# Patient Record
Sex: MALE | Race: WHITE | Employment: FULL TIME | URBAN - NONMETROPOLITAN AREA
[De-identification: names, ages, dates, MRNs, and addresses within clinical notes are randomized per-mention and may not be internally consistent; named-entity substitution may affect disease eponyms.]

---

## 2019-12-06 ENCOUNTER — OFFICE VISIT (OUTPATIENT)
Dept: URGENT CARE | Age: 49
End: 2019-12-06
Payer: COMMERCIAL

## 2019-12-06 ENCOUNTER — HOSPITAL ENCOUNTER (OUTPATIENT)
Dept: GENERAL RADIOLOGY | Age: 49
Discharge: HOME | End: 2019-12-06
Payer: COMMERCIAL

## 2019-12-06 VITALS
SYSTOLIC BLOOD PRESSURE: 160 MMHG | OXYGEN SATURATION: 98 % | DIASTOLIC BLOOD PRESSURE: 86 MMHG | WEIGHT: 265 LBS | BODY MASS INDEX: 37.94 KG/M2 | RESPIRATION RATE: 18 BRPM | HEART RATE: 102 BPM | HEIGHT: 70 IN | TEMPERATURE: 98.9 F
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
## 2019-12-06 DIAGNOSIS — M54.2 NECK PAIN, ACUTE: ICD-10-CM

## 2019-12-06 DIAGNOSIS — M54.50 ACUTE LOW BACK PAIN WITHOUT SCIATICA, UNSPECIFIED BACK PAIN LATERALITY: ICD-10-CM

## 2019-12-06 PROCEDURE — 72040 X-RAY EXAM NECK SPINE 2-3 VW: CPT

## 2019-12-06 PROCEDURE — 72100 X-RAY EXAM L-S SPINE 2/3 VWS: CPT

## 2019-12-06 PROCEDURE — 99202 OFFICE O/P NEW SF 15 MIN: CPT | Performed by: NURSE PRACTITIONER

## 2019-12-06 RX ORDER — TIZANIDINE 4 MG/1
4 TABLET ORAL EVERY 8 HOURS PRN
Qty: 30 TABLET | Refills: 0 | Status: SHIPPED | OUTPATIENT
Start: 2019-12-06 | End: 2019-12-16

## 2019-12-06 SDOH — HEALTH STABILITY: MENTAL HEALTH: HOW OFTEN DO YOU HAVE A DRINK CONTAINING ALCOHOL?: NEVER

## 2019-12-06 ASSESSMENT — ENCOUNTER SYMPTOMS
PHOTOPHOBIA: 0
VISUAL CHANGE: 0
SINUS PRESSURE: 0
EYES NEGATIVE: 1
COUGH: 0
SHORTNESS OF BREATH: 0
SORE THROAT: 0
EYE WATERING: 0
EYE REDNESS: 0
EYE PAIN: 0
VOMITING: 0
BLURRED VISION: 0
ABDOMINAL PAIN: 0
SCALP TENDERNESS: 0
ALLERGIC/IMMUNOLOGIC NEGATIVE: 1
BACK PAIN: 1
DIARRHEA: 0
NAUSEA: 0

## 2019-12-06 ASSESSMENT — VISUAL ACUITY: OU: 1

## 2023-08-08 ENCOUNTER — OFFICE VISIT (OUTPATIENT)
Dept: FAMILY MEDICINE CLINIC | Facility: CLINIC | Age: 53
End: 2023-08-08
Payer: COMMERCIAL

## 2023-08-08 ENCOUNTER — TELEPHONE (OUTPATIENT)
Dept: FAMILY MEDICINE CLINIC | Facility: CLINIC | Age: 53
End: 2023-08-08

## 2023-08-08 VITALS
SYSTOLIC BLOOD PRESSURE: 148 MMHG | WEIGHT: 268 LBS | HEIGHT: 70 IN | BODY MASS INDEX: 38.37 KG/M2 | DIASTOLIC BLOOD PRESSURE: 92 MMHG | TEMPERATURE: 99.3 F | HEART RATE: 75 BPM | RESPIRATION RATE: 20 BRPM

## 2023-08-08 DIAGNOSIS — H60.391 OTHER INFECTIVE ACUTE OTITIS EXTERNA OF RIGHT EAR: Primary | ICD-10-CM

## 2023-08-08 DIAGNOSIS — R59.0 POSTERIOR AURICULAR LYMPHADENOPATHY: ICD-10-CM

## 2023-08-08 DIAGNOSIS — N52.9 ERECTILE DYSFUNCTION, UNSPECIFIED ERECTILE DYSFUNCTION TYPE: ICD-10-CM

## 2023-08-08 RX ORDER — SILDENAFIL 100 MG/1
100 TABLET, FILM COATED ORAL DAILY PRN
Qty: 30 TABLET | Refills: 0 | Status: SHIPPED | OUTPATIENT
Start: 2023-08-08

## 2023-08-08 RX ORDER — CIPROFLOXACIN 0.5 MG/.25ML
SOLUTION/ DROPS AURICULAR (OTIC)
Qty: 1 EACH | Refills: 0 | Status: SHIPPED | OUTPATIENT
Start: 2023-08-08 | End: 2023-08-08

## 2023-08-08 RX ORDER — LEVOFLOXACIN 500 MG/1
500 TABLET, FILM COATED ORAL DAILY
Qty: 10 TABLET | Refills: 0 | Status: SHIPPED | OUTPATIENT
Start: 2023-08-08 | End: 2023-08-18

## 2023-08-08 RX ORDER — OFLOXACIN 3 MG/ML
SOLUTION AURICULAR (OTIC)
Qty: 10 ML | Refills: 0 | Status: SHIPPED | OUTPATIENT
Start: 2023-08-08

## 2023-08-08 RX ORDER — CEFTRIAXONE 1 G/1
1 INJECTION, POWDER, FOR SOLUTION INTRAMUSCULAR; INTRAVENOUS EVERY 24 HOURS
Status: COMPLETED | OUTPATIENT
Start: 2023-08-08 | End: 2023-08-08

## 2023-08-08 RX ADMIN — CEFTRIAXONE 1 G: 1 INJECTION, POWDER, FOR SOLUTION INTRAMUSCULAR; INTRAVENOUS at 13:08

## 2023-08-08 NOTE — PROGRESS NOTES
"Chief Complaint  right earache, erecetile dysfunction, and possible ear infection    Subjective    History of Present Illness      Patient presents to [facility] for   History of Present Illness  Pt is here today c/o of an earache in his right ear.  Temp is currently 99.3 and pt said he did have ibuprofen around 6 AM today.  Pt states he went scuba diving over the weekend and thinks he has an ear infection now.  Pt also wanted to discuss erectile dysfunction and what treatment options are available.     Review of Systems    I have reviewed and agree with the HPI and ROS information as above.  Yusra Nance, APRN     Objective   Vital Signs:   /92   Pulse 75   Temp 99.3 øF (37.4 øC)   Resp 20   Ht 177.8 cm (70\")   Wt 122 kg (268 lb)   BMI 38.45 kg/mý         Physical Exam  Constitutional:       Appearance: He is well-developed. He is obese.   HENT:      Head: Normocephalic and atraumatic.      Right Ear: External ear normal. Swelling and tenderness present.      Left Ear: Tympanic membrane, ear canal and external ear normal.      Ears:      Comments: Moderate post auricular lymphadenopathy of the right side      Nose: Nose normal. No septal deviation, nasal tenderness or congestion.      Mouth/Throat:      Lips: Pink. No lesions.      Mouth: Mucous membranes are moist. No oral lesions.      Dentition: Normal dentition.      Pharynx: Oropharynx is clear. No pharyngeal swelling, oropharyngeal exudate or posterior oropharyngeal erythema.   Eyes:      General: Lids are normal. Vision grossly intact. No scleral icterus.        Right eye: No discharge.         Left eye: No discharge.      Extraocular Movements: Extraocular movements intact.      Conjunctiva/sclera: Conjunctivae normal.      Right eye: Right conjunctiva is not injected.      Left eye: Left conjunctiva is not injected.      Pupils: Pupils are equal, round, and reactive to light.   Neck:      Thyroid: No thyroid " mass.      Trachea: Trachea normal.   Cardiovascular:      Rate and Rhythm: Normal rate and regular rhythm.      Heart sounds: Normal heart sounds. No murmur heard.    No gallop.   Pulmonary:      Effort: Pulmonary effort is normal.      Breath sounds: Normal breath sounds and air entry. No wheezing, rhonchi or rales.   Abdominal:      General: There is no distension.      Palpations: Abdomen is soft. There is no mass.      Tenderness: There is no abdominal tenderness. There is no right CVA tenderness, left CVA tenderness, guarding or rebound.   Musculoskeletal:         General: No tenderness or deformity. Normal range of motion.      Cervical back: Full passive range of motion without pain, normal range of motion and neck supple.      Thoracic back: Normal.      Right lower leg: No edema.      Left lower leg: No edema.   Skin:     General: Skin is warm and dry.      Coloration: Skin is not jaundiced.      Findings: No rash.   Neurological:      Mental Status: He is alert and oriented to person, place, and time.      Sensory: Sensation is intact.      Motor: Motor function is intact.      Coordination: Coordination is intact.      Gait: Gait is intact.      Deep Tendon Reflexes: Reflexes are normal and symmetric.   Psychiatric:         Mood and Affect: Mood and affect normal.         Judgment: Judgment normal.             Result Review  Data Reviewed:                   Assessment and Plan      Diagnoses and all orders for this visit:    1. Other infective acute otitis externa of right ear (Primary)  -     cefTRIAXone (ROCEPHIN) injection 1 g  -     levoFLOXacin (Levaquin) 500 MG tablet; Take 1 tablet by mouth Daily for 10 days.  Dispense: 10 tablet; Refill: 0  -     Discontinue: Ciprofloxacin HCl (CETRAXAL) 0.2 % otic solution; 0.25ml in right ear bid x 7 days  Dispense: 1 each; Refill: 0  -     ofloxacin (Floxin Otic) 0.3 % otic solution; 5 drops in draining ear BID x 7 days  Dispense: 10 mL; Refill: 0    2.  Posterior auricular lymphadenopathy    3. Erectile dysfunction, unspecified erectile dysfunction type  -     sildenafil (Viagra) 100 MG tablet; Take 1 tablet by mouth Daily As Needed for Erectile Dysfunction.  Dispense: 30 tablet; Refill: 0    Plan:   Treat acutely with Narciso IM, follow with levaquin and ear drops.   Recheck tomorrow with 2nd Rocephin IM.   Trial Viagra prn. Counseling done. Monitor home Bps.         Follow Up   Return in about 1 day (around 8/9/2023).  Patient was given instructions and counseling regarding his condition or for health maintenance advice. Please see specific information pulled into the AVS if appropriate.

## 2023-08-08 NOTE — PROGRESS NOTES
After obtaining consent, and per orders of MANUELA Vega, an injection of Rocephin 1 g was  given by Eliud Henry MA and administered to the right dorso-gluteal IM. Patient instructed to remain in clinic for 20 minutes afterwards, and to report any adverse reaction to me immediately. Pt tolerated well with no adverse reactions.

## 2023-08-08 NOTE — TELEPHONE ENCOUNTER
Caller: [facility] Saavedra [facility]    Relationship: Other    Best call back number:  [phone]    What is the best time to reach you:  ANYTIME    Who are you requesting to speak with:  CLINICAL    What was the call regarding:  [facility] NEEDS CLARIFICATION ON DIRECTIONS REGARDING CIPROFLOXACIN.  CALLER SAID THEY'RE NOT SURE HOW TO MEASURE OUT THE 0.25 ML (HOW MANY DROPS EQUAL 0.25 ML).  CALLER SAID ALL  THEY HAVE RIGHT NOW IS 0.3% EYE DROPS, WHICH CAN'T BE USED IN EARS.      Do you require a call back?  YES

## 2023-08-09 ENCOUNTER — OFFICE VISIT (OUTPATIENT)
Dept: FAMILY MEDICINE CLINIC | Facility: CLINIC | Age: 53
End: 2023-08-09
Payer: COMMERCIAL

## 2023-08-09 VITALS
HEIGHT: 70 IN | DIASTOLIC BLOOD PRESSURE: 88 MMHG | RESPIRATION RATE: 20 BRPM | SYSTOLIC BLOOD PRESSURE: 147 MMHG | WEIGHT: 268 LBS | HEART RATE: 72 BPM | TEMPERATURE: 98.6 F | BODY MASS INDEX: 38.37 KG/M2

## 2023-08-09 DIAGNOSIS — R59.0 PREAURICULAR LYMPHADENOPATHY: ICD-10-CM

## 2023-08-09 DIAGNOSIS — H60.391 OTHER INFECTIVE ACUTE OTITIS EXTERNA OF RIGHT EAR: Primary | ICD-10-CM

## 2023-08-09 DIAGNOSIS — R59.0 POSTERIOR AURICULAR LYMPHADENOPATHY: ICD-10-CM

## 2023-08-09 RX ORDER — CEFTRIAXONE 1 G/1
1 INJECTION, POWDER, FOR SOLUTION INTRAMUSCULAR; INTRAVENOUS EVERY 24 HOURS
Status: COMPLETED | OUTPATIENT
Start: 2023-08-09 | End: 2023-08-09

## 2023-08-09 RX ORDER — DEXAMETHASONE SODIUM PHOSPHATE 10 MG/ML
8 INJECTION INTRAMUSCULAR; INTRAVENOUS ONCE
Status: COMPLETED | OUTPATIENT
Start: 2023-08-09 | End: 2023-08-09

## 2023-08-09 RX ADMIN — DEXAMETHASONE SODIUM PHOSPHATE 8 MG: 10 INJECTION INTRAMUSCULAR; INTRAVENOUS at 16:20

## 2023-08-09 RX ADMIN — CEFTRIAXONE 1 G: 1 INJECTION, POWDER, FOR SOLUTION INTRAMUSCULAR; INTRAVENOUS at 16:19

## 2023-08-09 NOTE — PROGRESS NOTES
After obtaining consent, and per orders of MANUELA Vega, an injection of Decadron 8 mg was  given by Eliud Henry MA and administered to the right dorsogluteal IM. Patient instructed to remain in clinic for 20 minutes afterwards, and to report any adverse reaction to me immediately. Pt tolerated well with no adverse reactions.

## 2023-08-09 NOTE — PROGRESS NOTES
After obtaining consent, and per orders of MANUELA Vega, an injection of Rocephin 1 g was given by Eliud Henry MA and administered to the left dorsogluteal IM. Patient instructed to remain in clinic for 20 minutes afterwards, and to report any adverse reaction to me immediately. Pt tolerated well with no adverse reactions.

## 2023-08-09 NOTE — PROGRESS NOTES
"Chief Complaint  ear infection    Subjective    History of Present Illness      Patient presents to [facility] for   History of Present Illness  Pt is here for a 1 day f/u and a rocephin shot for a right external ear canal infection. Pt aslo reports the wick fell out of his ear. He thinks he is some better.      Review of Systems    I have reviewed and agree with the HPI and ROS information as above.  Yusra Gisellecésar Nance, APRN     Objective   Vital Signs:   /88   Pulse 72   Temp 98.6 øF (37 øC)   Resp 20   Ht 177.8 cm (70\")   Wt 122 kg (268 lb)   BMI 38.45 kg/mý         Physical Exam  Constitutional:       Appearance: He is well-developed. He is obese.   HENT:      Head: Normocephalic and atraumatic.      Right Ear: External ear normal. Swelling present.      Left Ear: Tympanic membrane, ear canal and external ear normal.      Ears:      Comments: Pre and posterior auricular lymphadenopathy right sided      Nose: Nose normal. No septal deviation, nasal tenderness or congestion.      Mouth/Throat:      Lips: Pink. No lesions.      Mouth: Mucous membranes are moist. No oral lesions.      Dentition: Normal dentition.      Pharynx: Oropharynx is clear. No pharyngeal swelling, oropharyngeal exudate or posterior oropharyngeal erythema.   Eyes:      General: Lids are normal. Vision grossly intact. No scleral icterus.        Right eye: No discharge.         Left eye: No discharge.      Extraocular Movements: Extraocular movements intact.      Conjunctiva/sclera: Conjunctivae normal.      Right eye: Right conjunctiva is not injected.      Left eye: Left conjunctiva is not injected.      Pupils: Pupils are equal, round, and reactive to light.   Neck:      Thyroid: No thyroid mass.      Trachea: Trachea normal.   Cardiovascular:      Rate and Rhythm: Normal rate and regular rhythm.      Heart sounds: Normal heart sounds. No murmur heard.    No gallop.   Pulmonary:      Effort: Pulmonary " effort is normal.      Breath sounds: Normal breath sounds and air entry. No wheezing, rhonchi or rales.   Abdominal:      General: There is no distension.      Palpations: Abdomen is soft. There is no mass.      Tenderness: There is no abdominal tenderness. There is no right CVA tenderness, left CVA tenderness, guarding or rebound.   Musculoskeletal:         General: No tenderness or deformity. Normal range of motion.      Cervical back: Full passive range of motion without pain, normal range of motion and neck supple.      Thoracic back: Normal.      Right lower leg: No edema.      Left lower leg: No edema.   Skin:     General: Skin is warm and dry.      Coloration: Skin is not jaundiced.      Findings: No rash.   Neurological:      Mental Status: He is alert and oriented to person, place, and time.      Sensory: Sensation is intact.      Motor: Motor function is intact.      Coordination: Coordination is intact.      Gait: Gait is intact.      Deep Tendon Reflexes: Reflexes are normal and symmetric.   Psychiatric:         Mood and Affect: Mood and affect normal.         Judgment: Judgment normal.        JEANETTE-7: Over the last two weeks, how often have you been bothered by the following problems?  Feeling nervous, anxious or on edge: Not at all  Not being able to stop or control worrying: Not at all  Worrying too much about different things: Not at all  Trouble Relaxing: Not at all  Being so restless that it is hard to sit still: Not at all  Becoming easily annoyed or irritable: Not at all  Feeling afraid as if something awful might happen: Not at all  JEANETTE 7 Total Score: 0  If you checked any problems, how difficult have these problems made it for you to do your work, take care of things at home, or get along with other people: Not difficult at all    PHQ-2 Depression Screening  Little interest or pleasure in doing things? 0-->not at all   Feeling down, depressed, or hopeless? 0-->not at all   PHQ-2 Total Score 0      PHQ-9 Depression Screening  Little interest or pleasure in doing things? 0-->not at all   Feeling down, depressed, or hopeless? 0-->not at all   Trouble falling or staying asleep, or sleeping too much?     Feeling tired or having little energy?     Poor appetite or overeating?     Feeling bad about yourself - or that you are a failure or have let yourself or your family down?     Trouble concentrating on things, such as reading the newspaper or watching television?     Moving or speaking so slowly that other people could have noticed? Or the opposite - being so fidgety or restless that you have been moving around a lot more than usual?     Thoughts that you would be better off dead, or of hurting yourself in some way?     PHQ-9 Total Score 0   If you checked off any problems, how difficult have these problems made it for you to do your work, take care of things at home, or get along with other people?        Result Review  Data Reviewed:                   Assessment and Plan      Diagnoses and all orders for this visit:    1. Other infective acute otitis externa of right ear (Primary)  -     cefTRIAXone (ROCEPHIN) injection 1 g  -     dexAMETHasone (DECADRON) injection 8 mg    2. Posterior auricular lymphadenopathy  -     cefTRIAXone (ROCEPHIN) injection 1 g  -     dexAMETHasone (DECADRON) injection 8 mg    3. Preauricular lymphadenopathy  -     cefTRIAXone (ROCEPHIN) injection 1 g  -     dexAMETHasone (DECADRON) injection 8 mg    Recheck slightly better today. Dr Amin also examined.   Plan:   Second rocephin and steroid Im today.   Continue Levaquin PO and drops.   Recheck in 2 days if needed. Er if worsening.         Follow Up   Return in about 2 days (around 8/11/2023).  Patient was given instructions and counseling regarding his condition or for health maintenance advice. Please see specific information pulled into the AVS if appropriate.

## 2023-10-19 ENCOUNTER — APPOINTMENT (OUTPATIENT)
Dept: GENERAL RADIOLOGY | Facility: HOSPITAL | Age: 53
End: 2023-10-19
Payer: COMMERCIAL

## 2023-10-19 ENCOUNTER — HOSPITAL ENCOUNTER (OUTPATIENT)
Facility: HOSPITAL | Age: 53
Setting detail: OBSERVATION
Discharge: HOME | End: 2023-10-20
Attending: STUDENT IN AN ORGANIZED HEALTH CARE EDUCATION/TRAINING PROGRAM | Admitting: INTERNAL MEDICINE
Payer: COMMERCIAL

## 2023-10-19 DIAGNOSIS — R61 DIAPHORESIS: ICD-10-CM

## 2023-10-19 DIAGNOSIS — R79.89 ELEVATED TROPONIN: ICD-10-CM

## 2023-10-19 DIAGNOSIS — R06.02 SHORTNESS OF BREATH: ICD-10-CM

## 2023-10-19 DIAGNOSIS — R07.9 ACUTE CHEST PAIN: ICD-10-CM

## 2023-10-19 DIAGNOSIS — R00.2 PALPITATIONS: ICD-10-CM

## 2023-10-19 DIAGNOSIS — E78.1 HIGH BLOOD TRIGLYCERIDES: ICD-10-CM

## 2023-10-19 DIAGNOSIS — I47.10 SUPRAVENTRICULAR TACHYCARDIA: Primary | ICD-10-CM

## 2023-10-19 PROCEDURE — 36415 COLL VENOUS BLD VENIPUNCTURE: CPT

## 2023-10-19 PROCEDURE — 80053 COMPREHEN METABOLIC PANEL: CPT | Performed by: STUDENT IN AN ORGANIZED HEALTH CARE EDUCATION/TRAINING PROGRAM

## 2023-10-19 PROCEDURE — 25010000002 ADENOSINE PER 6 MG

## 2023-10-19 PROCEDURE — 83690 ASSAY OF LIPASE: CPT | Performed by: STUDENT IN AN ORGANIZED HEALTH CARE EDUCATION/TRAINING PROGRAM

## 2023-10-19 PROCEDURE — 80061 LIPID PANEL: CPT | Performed by: STUDENT IN AN ORGANIZED HEALTH CARE EDUCATION/TRAINING PROGRAM

## 2023-10-19 PROCEDURE — 85025 COMPLETE CBC W/AUTO DIFF WBC: CPT | Performed by: STUDENT IN AN ORGANIZED HEALTH CARE EDUCATION/TRAINING PROGRAM

## 2023-10-19 PROCEDURE — 99285 EMERGENCY DEPT VISIT HI MDM: CPT

## 2023-10-19 PROCEDURE — 93005 ELECTROCARDIOGRAM TRACING: CPT | Performed by: STUDENT IN AN ORGANIZED HEALTH CARE EDUCATION/TRAINING PROGRAM

## 2023-10-19 PROCEDURE — 83036 HEMOGLOBIN GLYCOSYLATED A1C: CPT | Performed by: STUDENT IN AN ORGANIZED HEALTH CARE EDUCATION/TRAINING PROGRAM

## 2023-10-19 PROCEDURE — 83735 ASSAY OF MAGNESIUM: CPT | Performed by: STUDENT IN AN ORGANIZED HEALTH CARE EDUCATION/TRAINING PROGRAM

## 2023-10-19 PROCEDURE — 71045 X-RAY EXAM CHEST 1 VIEW: CPT

## 2023-10-19 PROCEDURE — 83880 ASSAY OF NATRIURETIC PEPTIDE: CPT | Performed by: STUDENT IN AN ORGANIZED HEALTH CARE EDUCATION/TRAINING PROGRAM

## 2023-10-19 PROCEDURE — 25810000003 LACTATED RINGERS SOLUTION: Performed by: STUDENT IN AN ORGANIZED HEALTH CARE EDUCATION/TRAINING PROGRAM

## 2023-10-19 PROCEDURE — 93010 ELECTROCARDIOGRAM REPORT: CPT | Performed by: INTERNAL MEDICINE

## 2023-10-19 PROCEDURE — 87636 SARSCOV2 & INF A&B AMP PRB: CPT | Performed by: STUDENT IN AN ORGANIZED HEALTH CARE EDUCATION/TRAINING PROGRAM

## 2023-10-19 PROCEDURE — 96374 THER/PROPH/DIAG INJ IV PUSH: CPT

## 2023-10-19 PROCEDURE — 84484 ASSAY OF TROPONIN QUANT: CPT | Performed by: STUDENT IN AN ORGANIZED HEALTH CARE EDUCATION/TRAINING PROGRAM

## 2023-10-19 PROCEDURE — 93005 ELECTROCARDIOGRAM TRACING: CPT

## 2023-10-19 PROCEDURE — 85007 BL SMEAR W/DIFF WBC COUNT: CPT | Performed by: STUDENT IN AN ORGANIZED HEALTH CARE EDUCATION/TRAINING PROGRAM

## 2023-10-19 RX ORDER — ADENOSINE 3 MG/ML
INJECTION, SOLUTION INTRAVENOUS
Status: COMPLETED
Start: 2023-10-19 | End: 2023-10-19

## 2023-10-19 RX ORDER — ASPIRIN 81 MG/1
324 TABLET, CHEWABLE ORAL ONCE
Status: COMPLETED | OUTPATIENT
Start: 2023-10-19 | End: 2023-10-20

## 2023-10-19 RX ORDER — ADENOSINE 3 MG/ML
12 INJECTION, SOLUTION INTRAVENOUS ONCE AS NEEDED
Status: COMPLETED | OUTPATIENT
Start: 2023-10-19 | End: 2023-10-19

## 2023-10-19 RX ORDER — SODIUM CHLORIDE 0.9 % (FLUSH) 0.9 %
10 SYRINGE (ML) INJECTION AS NEEDED
Status: DISCONTINUED | OUTPATIENT
Start: 2023-10-19 | End: 2023-10-20 | Stop reason: HOSPADM

## 2023-10-19 RX ORDER — ADENOSINE 3 MG/ML
6 INJECTION, SOLUTION INTRAVENOUS ONCE AS NEEDED
Status: DISCONTINUED | OUTPATIENT
Start: 2023-10-19 | End: 2023-10-20 | Stop reason: HOSPADM

## 2023-10-19 RX ADMIN — ADENOSINE 6 MG: 3 INJECTION INTRAVENOUS at 23:53

## 2023-10-19 RX ADMIN — ADENOSINE 6 MG: 3 INJECTION, SOLUTION INTRAVENOUS at 23:53

## 2023-10-19 RX ADMIN — ADENOSINE 12 MG: 3 INJECTION, SOLUTION INTRAVENOUS at 23:55

## 2023-10-19 RX ADMIN — SODIUM CHLORIDE, POTASSIUM CHLORIDE, SODIUM LACTATE AND CALCIUM CHLORIDE 1000 ML: 600; 310; 30; 20 INJECTION, SOLUTION INTRAVENOUS at 23:50

## 2023-10-19 RX ADMIN — ADENOSINE 12 MG: 3 INJECTION INTRAVENOUS at 23:55

## 2023-10-19 NOTE — Clinical Note
[facility] EMERGENCY DEPARTMENT  [address]  [facility] [address]  Phone: [phone]    Omer Hannon was seen and treated in our emergency department on 10/19/2023.  He may return to work on 10/22/2023.         Thank you for choosing [facility].    Juan Mathews MD

## 2023-10-20 ENCOUNTER — APPOINTMENT (OUTPATIENT)
Dept: CARDIOLOGY | Facility: HOSPITAL | Age: 53
End: 2023-10-20
Payer: COMMERCIAL

## 2023-10-20 VITALS
HEIGHT: 71 IN | TEMPERATURE: 98.1 F | BODY MASS INDEX: 38.08 KG/M2 | WEIGHT: 272 LBS | HEART RATE: 76 BPM | DIASTOLIC BLOOD PRESSURE: 86 MMHG | SYSTOLIC BLOOD PRESSURE: 139 MMHG | RESPIRATION RATE: 18 BRPM | OXYGEN SATURATION: 95 %

## 2023-10-20 PROBLEM — R79.89 ELEVATED TROPONIN: Status: ACTIVE | Noted: 2023-10-20

## 2023-10-20 LAB
ALBUMIN SERPL-MCNC: 4.4 G/DL (ref 3.5–5.2)
ALBUMIN/GLOB SERPL: 1.4 G/DL
ALP SERPL-CCNC: 69 U/L (ref 39–117)
ALT SERPL W P-5'-P-CCNC: 37 U/L (ref 1–41)
ANION GAP SERPL CALCULATED.3IONS-SCNC: 11 MMOL/L (ref 5–15)
ANISOCYTOSIS BLD QL: ABNORMAL
AST SERPL-CCNC: 24 U/L (ref 1–40)
BH CV ECHO MEAS - AO MAX PG: 4.6 MMHG
BH CV ECHO MEAS - AO MEAN PG: 2 MMHG
BH CV ECHO MEAS - AO ROOT DIAM: 3.4 CM
BH CV ECHO MEAS - AO V2 MAX: 107 CM/SEC
BH CV ECHO MEAS - AO V2 VTI: 21.1 CM
BH CV ECHO MEAS - AVA(I,D): 4 CM2
BH CV ECHO MEAS - EDV(CUBED): 97.3 ML
BH CV ECHO MEAS - EDV(MOD-SP2): 111 ML
BH CV ECHO MEAS - EDV(MOD-SP4): 119 ML
BH CV ECHO MEAS - EF(MOD-BP): 71.3 %
BH CV ECHO MEAS - EF(MOD-SP2): 62.4 %
BH CV ECHO MEAS - EF(MOD-SP4): 77.8 %
BH CV ECHO MEAS - ESV(CUBED): 22 ML
BH CV ECHO MEAS - ESV(MOD-SP2): 41.7 ML
BH CV ECHO MEAS - ESV(MOD-SP4): 26.4 ML
BH CV ECHO MEAS - FS: 39.1 %
BH CV ECHO MEAS - IVS/LVPW: 1.4 CM
BH CV ECHO MEAS - IVSD: 1.4 CM
BH CV ECHO MEAS - LA DIMENSION: 4.5 CM
BH CV ECHO MEAS - LAT PEAK E' VEL: 7.1 CM/SEC
BH CV ECHO MEAS - LV DIASTOLIC VOL/BSA (35-75): 49.5 CM2
BH CV ECHO MEAS - LV MASS(C)D: 205 GRAMS
BH CV ECHO MEAS - LV MAX PG: 3.2 MMHG
BH CV ECHO MEAS - LV MEAN PG: 2 MMHG
BH CV ECHO MEAS - LV SYSTOLIC VOL/BSA (12-30): 11 CM2
BH CV ECHO MEAS - LV V1 MAX: 89.1 CM/SEC
BH CV ECHO MEAS - LV V1 VTI: 17.3 CM
BH CV ECHO MEAS - LVIDD: 4.6 CM
BH CV ECHO MEAS - LVIDS: 2.8 CM
BH CV ECHO MEAS - LVOT AREA: 4.9 CM2
BH CV ECHO MEAS - LVOT DIAM: 2.5 CM
BH CV ECHO MEAS - LVPWD: 1 CM
BH CV ECHO MEAS - MED PEAK E' VEL: 7.5 CM/SEC
BH CV ECHO MEAS - MV A MAX VEL: 60 CM/SEC
BH CV ECHO MEAS - MV DEC TIME: 0.23 SEC
BH CV ECHO MEAS - MV E MAX VEL: 60.4 CM/SEC
BH CV ECHO MEAS - MV E/A: 1.01
BH CV ECHO MEAS - RAP SYSTOLE: 5 MMHG
BH CV ECHO MEAS - RVSP: 17.8 MMHG
BH CV ECHO MEAS - SI(MOD-SP2): 28.8 ML/M2
BH CV ECHO MEAS - SI(MOD-SP4): 38.5 ML/M2
BH CV ECHO MEAS - SV(LVOT): 84.9 ML
BH CV ECHO MEAS - SV(MOD-SP2): 69.3 ML
BH CV ECHO MEAS - SV(MOD-SP4): 92.6 ML
BH CV ECHO MEAS - TR MAX PG: 12.8 MMHG
BH CV ECHO MEAS - TR MAX VEL: 179 CM/SEC
BH CV ECHO MEASUREMENTS AVERAGE E/E' RATIO: 8.27
BILIRUB SERPL-MCNC: 0.6 MG/DL (ref 0–1.2)
BUN SERPL-MCNC: 12 MG/DL (ref 6–20)
BUN/CREAT SERPL: 13.6 (ref 7–25)
CALCIUM SPEC-SCNC: 9.8 MG/DL (ref 8.6–10.5)
CHLORIDE SERPL-SCNC: 99 MMOL/L (ref 98–107)
CHOLEST SERPL-MCNC: 181 MG/DL (ref 0–200)
CO2 SERPL-SCNC: 28 MMOL/L (ref 22–29)
CREAT SERPL-MCNC: 0.88 MG/DL (ref 0.76–1.27)
DEPRECATED RDW RBC AUTO: 42.4 FL (ref 37–54)
EGFRCR SERPLBLD CKD-EPI 2021: 103.5 ML/MIN/1.73
EOSINOPHIL # BLD MANUAL: 0.23 10*3/MM3 (ref 0–0.4)
EOSINOPHIL NFR BLD MANUAL: 2.1 % (ref 0.3–6.2)
ERYTHROCYTE [DISTWIDTH] IN BLOOD BY AUTOMATED COUNT: 12.8 % (ref 12.3–15.4)
FLUAV RNA RESP QL NAA+PROBE: NOT DETECTED
FLUBV RNA RESP QL NAA+PROBE: NOT DETECTED
GEN 5 2HR TROPONIN T REFLEX: 42 NG/L
GLOBULIN UR ELPH-MCNC: 3.1 GM/DL
GLUCOSE SERPL-MCNC: 204 MG/DL (ref 65–99)
HBA1C MFR BLD: 6.4 % (ref 4.8–5.6)
HCT VFR BLD AUTO: 44.8 % (ref 37.5–51)
HDLC SERPL-MCNC: 26 MG/DL (ref 40–60)
HGB BLD-MCNC: 15.1 G/DL (ref 13–17.7)
LDLC SERPL CALC-MCNC: 60 MG/DL (ref 0–100)
LDLC/HDLC SERPL: 1.11 {RATIO}
LEFT ATRIUM VOLUME INDEX: 23.5 ML/M2
LEFT ATRIUM VOLUME: 56.3 ML
LIPASE SERPL-CCNC: 33 U/L (ref 13–60)
LYMPHOCYTES # BLD MANUAL: 4.88 10*3/MM3 (ref 0.7–3.1)
LYMPHOCYTES NFR BLD MANUAL: 6.4 % (ref 5–12)
MAGNESIUM SERPL-MCNC: 1.9 MG/DL (ref 1.6–2.6)
MCH RBC QN AUTO: 30.7 PG (ref 26.6–33)
MCHC RBC AUTO-ENTMCNC: 33.7 G/DL (ref 31.5–35.7)
MCV RBC AUTO: 91.1 FL (ref 79–97)
MONOCYTES # BLD: 0.72 10*3/MM3 (ref 0.1–0.9)
NEUTROPHILS # BLD AUTO: 5.36 10*3/MM3 (ref 1.7–7)
NEUTROPHILS NFR BLD MANUAL: 44.7 % (ref 42.7–76)
NEUTS BAND NFR BLD MANUAL: 3.2 % (ref 0–5)
NRBC BLD AUTO-RTO: 0 /100 WBC (ref 0–0.2)
NT-PROBNP SERPL-MCNC: <36 PG/ML (ref 0–900)
PLAT MORPH BLD: NORMAL
PLATELET # BLD AUTO: 243 10*3/MM3 (ref 140–450)
PMV BLD AUTO: 10.4 FL (ref 6–12)
POIKILOCYTOSIS BLD QL SMEAR: ABNORMAL
POTASSIUM SERPL-SCNC: 3.9 MMOL/L (ref 3.5–5.2)
PROT SERPL-MCNC: 7.5 G/DL (ref 6–8.5)
QT INTERVAL: 374 MS
QTC INTERVAL: 431 MS
RBC # BLD AUTO: 4.92 10*6/MM3 (ref 4.14–5.8)
SARS-COV-2 RNA RESP QL NAA+PROBE: NOT DETECTED
SODIUM SERPL-SCNC: 138 MMOL/L (ref 136–145)
TRIGL SERPL-MCNC: 631 MG/DL (ref 0–150)
TROPONIN T DELTA: ABNORMAL
TROPONIN T SERPL HS-MCNC: <6 NG/L
TSH SERPL DL<=0.05 MIU/L-ACNC: 2.11 UIU/ML (ref 0.27–4.2)
VARIANT LYMPHS NFR BLD MANUAL: 43.6 % (ref 19.6–45.3)
VLDLC SERPL-MCNC: 95 MG/DL (ref 5–40)
WBC MORPH BLD: NORMAL
WBC NRBC COR # BLD: 11.19 10*3/MM3 (ref 3.4–10.8)

## 2023-10-20 PROCEDURE — 93005 ELECTROCARDIOGRAM TRACING: CPT | Performed by: NURSE PRACTITIONER

## 2023-10-20 PROCEDURE — 99235 HOSP IP/OBS SAME DATE MOD 70: CPT | Performed by: INTERNAL MEDICINE

## 2023-10-20 PROCEDURE — G0378 HOSPITAL OBSERVATION PER HR: HCPCS

## 2023-10-20 PROCEDURE — 93306 TTE W/DOPPLER COMPLETE: CPT

## 2023-10-20 PROCEDURE — 93010 ELECTROCARDIOGRAM REPORT: CPT | Performed by: INTERNAL MEDICINE

## 2023-10-20 PROCEDURE — 93306 TTE W/DOPPLER COMPLETE: CPT | Performed by: INTERNAL MEDICINE

## 2023-10-20 PROCEDURE — 84484 ASSAY OF TROPONIN QUANT: CPT | Performed by: STUDENT IN AN ORGANIZED HEALTH CARE EDUCATION/TRAINING PROGRAM

## 2023-10-20 PROCEDURE — 25510000001 PERFLUTREN 6.52 MG/ML SUSPENSION: Performed by: INTERNAL MEDICINE

## 2023-10-20 PROCEDURE — 92960 CARDIOVERSION ELECTRIC EXT: CPT

## 2023-10-20 PROCEDURE — 84443 ASSAY THYROID STIM HORMONE: CPT | Performed by: NURSE PRACTITIONER

## 2023-10-20 RX ORDER — ASPIRIN 81 MG/1
81 TABLET ORAL DAILY
Qty: 30 TABLET | Refills: 0 | Status: SHIPPED | OUTPATIENT
Start: 2023-10-20 | End: 2023-10-20 | Stop reason: HOSPADM

## 2023-10-20 RX ORDER — METOPROLOL SUCCINATE 25 MG/1
25 TABLET, EXTENDED RELEASE ORAL
Qty: 30 TABLET | Refills: 11 | Status: SHIPPED | OUTPATIENT
Start: 2023-10-21

## 2023-10-20 RX ORDER — ETOMIDATE 2 MG/ML
18 INJECTION INTRAVENOUS ONCE
Status: COMPLETED | OUTPATIENT
Start: 2023-10-20 | End: 2023-10-20

## 2023-10-20 RX ORDER — METOPROLOL SUCCINATE 25 MG/1
25 TABLET, EXTENDED RELEASE ORAL
Status: DISCONTINUED | OUTPATIENT
Start: 2023-10-20 | End: 2023-10-20 | Stop reason: HOSPADM

## 2023-10-20 RX ADMIN — ETOMIDATE INJECTION 18 MG: 2 SOLUTION INTRAVENOUS at 00:17

## 2023-10-20 RX ADMIN — PERFLUTREN 9.78 MG: 6.52 INJECTION, SUSPENSION INTRAVENOUS at 15:39

## 2023-10-20 RX ADMIN — METOPROLOL SUCCINATE 25 MG: 25 TABLET, EXTENDED RELEASE ORAL at 11:01

## 2023-10-20 RX ADMIN — ASPIRIN 324 MG: 81 TABLET, CHEWABLE ORAL at 00:01

## 2023-10-20 NOTE — ED PROCEDURE NOTE
Place of Service:[facility] EMERGENCY DEPARTMENT  Patient Name:Omer Hannon  :1970    Procedure  Electrical Cardioversion    Date/Time: 10/20/2023 12:01 AM    Performed by: Juan Mathews MD  Authorized by: Juan Mathews MD    Consent:     Consent obtained:  Written and verbal    Consent given by:  Spouse and patient    Risks, benefits, and alternatives were discussed: yes      Risks discussed:  Cutaneous burn, death, pain and induced arrhythmia (stroke)    Alternatives discussed:  Observation, delayed treatment and no treatment  Universal protocol:     Procedure explained and questions answered to patient or proxy's satisfaction: yes      Immediately prior to procedure, a time out was called: yes      Patient identity confirmed:  Verbally with patient and arm band  Pre-procedure details:     Cardioversion basis:  Emergent    Rhythm:  Supraventricular tachycardia    Electrode placement:  Anterior-posterior  Attempt one:     Cardioversion mode:  Synchronous    Waveform:  Monophasic    Shock (Joules):  200    Shock outcome:  Conversion to normal sinus rhythm  Post-procedure details:     Patient status:  Awake    Procedure completion:  Tolerated well, no immediate complications            Juan Mathews MD  10/20/23 0031

## 2023-10-20 NOTE — PLAN OF CARE
Goal Outcome Evaluation:                   Patient to be discharged. Awaiting MD to see and echo results. SB/S 58-88 on tele. No c/o pain.

## 2023-10-20 NOTE — ED PROCEDURE NOTE
cPlace of Service:[facility] EMERGENCY DEPARTMENT  Patient Name:Omer Hannon  :1970    Procedure  Chemical Cardioversion    Date/Time: 10/20/2023 12:00 AM    Performed by: Juan Mathews MD  Authorized by: Juan Mathews MD    Consent:     Consent obtained:  Verbal    Consent given by:  Patient and spouse    Risks discussed:  Death, induced arrhythmia and pain    Alternatives discussed:  No treatment and electrical cardioversion  Pre-procedure details:     Cardioversion basis:  Emergent    Rhythm:  Supraventricular tachycardia  Attempt one:     Cardioversion medication:  Adenosine 6mg      Medication outcome:  No change in rhythm  Attempt two:     Cardioversion medication:  Adenosine 12mg      Shock outcome:  No change in rhythm   Post-procedure details:     Patient status:  Awake    Patient tolerance of procedure:  Tolerated well, no immediate complications            Juan Mathews MD  10/20/23 0000

## 2023-10-20 NOTE — H&P
[facility] HISTORY AND PHYSICAL      Patient Care Team:  Provider, No Known as PCP - General    Chief complaint: palpitations     Subjective     Omer Hannon is a 53 y.o. male presents in sustained SVT.  The patient reports he was sitting at home on his computer last night when he developed palpitations, lightheadedness, diaphoresis, mild dyspnea and chest tightness.  He states he checked his heart rate with a home monitor and it was noted to be 200 so he presented to the ER.  He does tell me since he was in his 30s he has been experiencing intermittent flutters that typically only last a few seconds and occur after consuming large amounts of caffeine.  He states this is the first time the palpitations have sustained.  He does report when the potation started last night he was actively drinking Mountain Dew and had probably had half of a 2 L at that point.    When he presented to the ER he was found to be in SVT with a heart rate around 210.  He was treated with adenosine 6 mg followed by adenosine 12 mg.  He did not convert to normal sinus rhythm with this.  He was ultimately cardioverted successfully at 1201 this morning.  Since that time he has maintained normal sinus rhythm with heart rates in the 60s to 80s.  He does tell me his symptoms completely resolved with restoration of normal sinus rhythm.    When questioned about any other symptoms lately, he states he might be a little more tired than usual but he relates this to his poor sleeping habits.  He states he does not sleep well or sleep much.  Otherwise, he denies any chest discomfort or shortness of breath outside of what he experienced with the SVT.    His initial troponin was less than 6.  His follow-up troponin was 42.    It appears he did receive 1 L of lactated Ringer's and full dose aspirin slightly after midnight.    His chest x-ray suggest a possible left lung infiltrate.  He denies fever, cough, chills, dyspnea outside  of what he experienced with the SVT.    He denies any prior cardiac history.    EKG restoration of normal sinus rhythm at 00 18 revealed possible inferior Q waves and anterolateral ST depression.    Review of Systems  Review of Systems   Constitutional: Positive for diaphoresis. Negative for malaise/fatigue.   Cardiovascular:  Positive for chest pain and palpitations. Negative for claudication, dyspnea on exertion, leg swelling, near-syncope, orthopnea, paroxysmal nocturnal dyspnea and syncope.   Respiratory:  Positive for shortness of breath. Negative for cough.    Hematologic/Lymphatic: Does not bruise/bleed easily.   Musculoskeletal:  Negative for falls.   Gastrointestinal:  Negative for bloating.   Neurological:  Positive for light-headedness. Negative for dizziness and weakness.        History  History reviewed. No pertinent past medical history.  History reviewed. No pertinent surgical history.  History reviewed. No pertinent family history.  Social History     Tobacco Use    Smoking status: Never     Passive exposure: Never    Smokeless tobacco: Never   Vaping Use    Vaping Use: Never used   Substance Use Topics    Alcohol use: Never    Drug use: Never       Medications  Prior to Admission medications    Medication Sig Start Date End Date Taking? Authorizing Provider   sildenafil (Viagra) 100 MG tablet Take 1 tablet by mouth Daily As Needed for Erectile Dysfunction. 8/8/23  Yes Yusra Nance APRN   aspirin 81 MG EC tablet Take 1 tablet by mouth Daily for 30 days. 10/20/23 11/19/23  Juan Mathews MD   metoprolol tartrate (LOPRESSOR) 25 MG tablet Take 2 tablets by mouth Daily for 30 days. 10/20/23 11/19/23  Juan Mathews MD   ofloxacin (Floxin Otic) 0.3 % otic solution 5 drops in draining ear BID x 7 days 8/8/23   Yusra Nance APRN       Current Facility-Administered Medications   Medication Dose Route Frequency Provider Last Rate Last Admin    adenosine (ADENOCARD) injection 6 mg  6 mg  Intravenous Once PRN Juan Mathews MD        metoprolol succinate XL (TOPROL-XL) 24 hr tablet 25 mg  25 mg Oral Q24H Sandrita Guerrero APRN        sodium chloride 0.9 % flush 10 mL  10 mL Intravenous PRN Juan Mathews MD            Allergies:  Patient has no known allergies.    Objective     Vital Signs  Temp:  [97.9 °F (36.6 °C)-98.2 °F (36.8 °C)] 98.2 °F (36.8 °C)  Heart Rate:  [] 80  Resp:  [13-20] 18  BP: ()/(60-99) 139/79    Labs  Lab Results (last 72 hours)       Procedure Component Value Units Date/Time    High Sensitivity Troponin T 2Hr [[id number]]  (Abnormal) Collected: 10/20/23 0302    Specimen: Blood Updated: 10/20/23 0342     HS Troponin T 42 ng/L      Comment: Specimen hemolyzed.  Results may be affected.        Troponin T Delta --     Comment: Unable to calculate.       Narrative:      High Sensitive Troponin T Reference Range:  <10.0 ng/L- Negative Female for AMI  <15.0 ng/L- Negative Male for AMI  >=10 - Abnormal Female indicating possible myocardial injury.  >=15 - Abnormal Male indicating possible myocardial injury.   Clinicians would have to utilize clinical acumen, EKG, Troponin, and serial changes to determine if it is an Acute Myocardial Infarction or myocardial injury due to an underlying chronic condition.         Manual Differential [[id number]]  (Abnormal) Collected: 10/19/23 2345    Specimen: Blood Updated: 10/20/23 0206     Neutrophil % 44.7 %      Lymphocyte % 43.6 %      Monocyte % 6.4 %      Eosinophil % 2.1 %      Bands %  3.2 %      Neutrophils Absolute 5.36 10*3/mm3      Lymphocytes Absolute 4.88 10*3/mm3      Monocytes Absolute 0.72 10*3/mm3      Eosinophils Absolute 0.23 10*3/mm3      Anisocytosis Slight/1+     Poikilocytes Slight/1+     WBC Morphology Normal     Platelet Morphology Normal    Hemoglobin A1c [[id number]]  (Abnormal) Collected: 10/19/23 2345    Specimen: Blood Updated: 10/20/23 0132     Hemoglobin A1C 6.40 %     Narrative:      Hemoglobin A1C  Ranges:    Increased Risk for Diabetes  5.7% to 6.4%  Diabetes                     >= 6.5%  Diabetic Goal                < 7.0%    Lipid Panel [[id number]]  (Abnormal) Collected: 10/19/23 2345    Specimen: Blood Updated: 10/20/23 0132     Total Cholesterol 181 mg/dL      Triglycerides 631 mg/dL      HDL Cholesterol 26 mg/dL      LDL Cholesterol  60 mg/dL      VLDL Cholesterol 95 mg/dL      LDL/HDL Ratio 1.11    Narrative:      Cholesterol Reference Ranges  (U.S. Department of Health and Human Services ATP III Classifications)    Desirable          <200 mg/dL  Borderline High    200-239 mg/dL  High Risk          >240 mg/dL      Triglyceride Reference Ranges  (U.S. Department of Health and Human Services ATP III Classifications)    Normal           <150 mg/dL  Borderline High  150-199 mg/dL  High             200-499 mg/dL  Very High        >500 mg/dL    HDL Reference Ranges  (U.S. Department of Health and Human Services ATP III Classifications)    Low     <40 mg/dl (major risk factor for CHD)  High    >60 mg/dl ('negative' risk factor for CHD)        LDL Reference Ranges  (U.S. Department of Health and Human Services ATP III Classifications)    Optimal          <100 mg/dL  Near Optimal     100-129 mg/dL  Borderline High  130-159 mg/dL  High             160-189 mg/dL  Very High        >189 mg/dL    CBC & Differential [[id number]]  (Abnormal) Collected: 10/19/23 2345    Specimen: Blood Updated: 10/20/23 0018    Narrative:      The following orders were created for panel order CBC & Differential.  Procedure                               Abnormality         Status                     ---------                               -----------         ------                     CBC Auto Differential[[id number]]        Abnormal            Final result                 Please view results for these tests on the individual orders.    CBC Auto Differential [[id number]]  (Abnormal) Collected: 10/19/23 2345    Specimen: Blood Updated:  10/20/23 0018     WBC 11.19 10*3/mm3      RBC 4.92 10*6/mm3      Hemoglobin 15.1 g/dL      Hematocrit 44.8 %      MCV 91.1 fL      MCH 30.7 pg      MCHC 33.7 g/dL      RDW 12.8 %      RDW-SD 42.4 fl      MPV 10.4 fL      Platelets 243 10*3/mm3      nRBC 0.0 /100 WBC     COVID-19 and FLU A/B PCR - Swab, Nasopharynx [[id number]]  (Normal) Collected: 10/19/23 2347    Specimen: Swab from Nasopharynx Updated: 10/20/23 0014     COVID19 Not Detected     Influenza A PCR Not Detected     Influenza B PCR Not Detected    Narrative:      Fact sheet for providers: https://www.fda.gov/media/266471/download    Fact sheet for patients: https://www.fda.gov/media/770469/download    Test performed by PCR.    Comprehensive Metabolic Panel [[id number]]  (Abnormal) Collected: 10/19/23 2345    Specimen: Blood Updated: 10/20/23 0014     Glucose 204 mg/dL      BUN 12 mg/dL      Creatinine 0.88 mg/dL      Sodium 138 mmol/L      Potassium 3.9 mmol/L      Comment: Specimen hemolyzed.  Results may be affected.        Chloride 99 mmol/L      CO2 28.0 mmol/L      Calcium 9.8 mg/dL      Total Protein 7.5 g/dL      Albumin 4.4 g/dL      ALT (SGPT) 37 U/L      Comment: Specimen hemolyzed.  Results may be affected.        AST (SGOT) 24 U/L      Comment: Specimen hemolyzed.  Results may be affected.        Alkaline Phosphatase 69 U/L      Total Bilirubin 0.6 mg/dL      Globulin 3.1 gm/dL      A/G Ratio 1.4 g/dL      BUN/Creatinine Ratio 13.6     Anion Gap 11.0 mmol/L      eGFR 103.5 mL/min/1.73     Narrative:      GFR Normal >60  Chronic Kidney Disease <60  Kidney Failure <15      Magnesium [[id number]]  (Normal) Collected: 10/19/23 2345    Specimen: Blood Updated: 10/20/23 0014     Magnesium 1.9 mg/dL     BNP [[id number]]  (Normal) Collected: 10/19/23 2345    Specimen: Blood Updated: 10/20/23 0010     proBNP <36.0 pg/mL     Narrative:      This assay is used as an aid in the diagnosis of individuals suspected of having heart failure. It can be used as an  aid in the diagnosis of acute decompensated heart failure (ADHF) in patients presenting with signs and symptoms of ADHF to the emergency department (ED). In addition, NT-proBNP of <300 pg/mL indicates ADHF is not likely.    Age Range Result Interpretation  NT-proBNP Concentration (pg/mL:      <50             Positive            >450                   Gray                 300-450                    Negative             <300    50-75           Positive            >900                  Gray                300-900                  Negative            <300      >75             Positive            >1800                  Gray                300-1800                  Negative            <300    High Sensitivity Troponin T [[id number]]  (Normal) Collected: 10/19/23 2345    Specimen: Blood Updated: 10/20/23 0010     HS Troponin T <6 ng/L     Narrative:      High Sensitive Troponin T Reference Range:  <10.0 ng/L- Negative Female for AMI  <15.0 ng/L- Negative Male for AMI  >=10 - Abnormal Female indicating possible myocardial injury.  >=15 - Abnormal Male indicating possible myocardial injury.   Clinicians would have to utilize clinical acumen, EKG, Troponin, and serial changes to determine if it is an Acute Myocardial Infarction or myocardial injury due to an underlying chronic condition.         Lipase [[id number]]  (Normal) Collected: 10/19/23 2345    Specimen: Blood Updated: 10/20/23 0008     Lipase 33 U/L             Physical Exam:  Vitals and nursing note reviewed.   Constitutional:       General: Not in acute distress.     Appearance: Well-developed and not in distress. Not diaphoretic.   Neck:      Vascular: No JVD.   Pulmonary:      Effort: Pulmonary effort is normal. No respiratory distress.      Breath sounds: Normal breath sounds.   Cardiovascular:      Normal rate. Regular rhythm.      Murmurs: There is no murmur.   Edema:     Peripheral edema absent.   Abdominal:      Tenderness: There is no abdominal tenderness.    Skin:     General: Skin is warm and dry.   Neurological:      Mental Status: Alert and oriented to person, place, and time.         Results Review:    I reviewed the patient's new clinical results.  I personally viewed and interpreted the patient's EKG/Telemetry data    Assessment & Plan     1.  Sustained SVT: The patient was symptomatic with palpitations, lightheadedness, diaphoresis, chest tightness and dyspnea.  He was treated with 6 mg of adenosine, followed by 12 mg of adenosine without conversion to normal sinus rhythm.  He ultimately required cardioversion and this was successful at 1201 this morning.  He has maintained normal sinus rhythm since then.  Symptoms resolved with restoration of normal sinus rhythm and have not recurred.  He did admit this occurred in the setting of consuming large amounts of caffeine (he believes he had drank approximately 1 L of Mountain Dew).    -Minimize stimulants, caffeine  -Check TSH  -Check echocardiogram  -Repeat EKG   -Add Toprol-XL 25 mg daily  -Monitor telemetry  -If he continues to do well and maintain normal sinus rhythm, home later today in a 14-day Zio patch    2.  Type II non-STEMI: Troponin trended from less than 6 up to 42 in the setting of SVT with a heart rate above 200 bpm requiring cardioversion.  No evidence of acute coronary syndrome.  He tells me he has not had any chest discomfort or shortness of breath outside of what he experienced with the SVT.  We are checking a 2D echocardiogram.  Depending upon assessment at follow-up in 4 weeks, we may consider an outpatient Lexiscan.    3.  Hypertriglyceridemia  4.  Obesity  5. Pre diabetes: A1c 6.4    I did encourage him to establish care with a primary care physician soon after discharge.  He will also need outpatient follow-up on his abnormal chest x-ray.    I discussed the patient's findings and my recommendations with patient, nursing staff, and primary care team.     Electronically signed by Sandrita Guerrero  MANUELA, 10/20/23, 9:01 AM CDT.

## 2023-10-20 NOTE — ED NOTES
Dr mathews at bedside, pt gave verbal permission and signed consent for cardioversion at bedside to be completed by Dr Mathews, Spouse at bedside signed as witness

## 2023-10-20 NOTE — ED PROVIDER NOTES
"EMERGENCY DEPARTMENT ATTENDING NOTE    Patient Name: Omer Hannon    Chief Complaint   Patient presents with    Rapid Heart Rate       PATIENT PRESENTATION:  Omer Hannon is a very pleasant 53 y.o. male with no significant past medical history presenting to the emergency department due to sudden onset palpitation chest pain and shortness of breath.    Patient states about 3 minutes prior to arrival he had sudden onset states that his heart was racing endorse some sharp chest pain as well as some shortness of breath.  States this happened few times for but he is never sought care that it spontaneously stopped.  He has never heard of any cardiac arrhythmias that he has had.  Patient has no personal cardiac history.  He does have a history of an early cardiac death in his uncle for the he was a smoker with bad health.  Patient does not smoke.  Patient does use caffeine quite frequently.      PHYSICAL EXAM:   VS: /91   Pulse 85   Temp 97.9 °F (36.6 °C) (Oral)   Resp 16   Ht 177.8 cm (70\")   Wt 124 kg (274 lb)   SpO2 95%   BMI 39.31 kg/m²   GENERAL: Well-appearing [place] gentleman sitting up in stretcher no acute distress; well-nourished, well-developed, awake, alert, no acute distress, nontoxic appearing, comfortable  EYES: PERRL, sclerae anicteric, extraocular movements grossly intact, symmetric lids  EARS, NOSE, MOUTH, THROAT: atraumatic external nose and ears, moist mucous membranes  NECK: symmetric, trachea midline  RESPIRATORY: unlabored respiratory effort, clear to auscultation bilaterally, good air movement  CARDIOVASCULAR: no murmurs, peripheral pulses 2+ and equal in all extremities  GI: soft, nontender, nondistended  MUSCULOSKELETAL/EXTREMITIES: no pitting lower extremity edema;; GCS 15 extremities without obvious deformity  SKIN: warm and dry with no obvious rashes  NEUROLOGIC: moving all 4 extremities symmetrically, CN II-XII grossly intact  PSYCHIATRIC: alert, pleasant and " cooperative. Appropriate mood and affect.      MEDICAL DECISION MAKING:    Omer Hannon is a 53 y.o. male who presented to the ED with sudden onset palpitations with chest pain shortness of breath.    Differential Diagnosis Considered: Supraventricular tachycardia, atrial fibrillation, ventricular tachycardia, nonsustained V. tach    Labs Ordered:  Labs Reviewed   COMPREHENSIVE METABOLIC PANEL - Abnormal; Notable for the following components:       Result Value    Glucose 204 (*)     All other components within normal limits    Narrative:     GFR Normal >60  Chronic Kidney Disease <60  Kidney Failure <15     CBC WITH AUTO DIFFERENTIAL - Abnormal; Notable for the following components:    WBC 11.19 (*)     All other components within normal limits   MANUAL DIFFERENTIAL - Abnormal; Notable for the following components:    Lymphocytes Absolute 4.88 (*)     All other components within normal limits   HIGH SENSITIVITIY TROPONIN T 2HR - Abnormal; Notable for the following components:    HS Troponin T 42 (*)     All other components within normal limits    Narrative:     High Sensitive Troponin T Reference Range:  <10.0 ng/L- Negative Female for AMI  <15.0 ng/L- Negative Male for AMI  >=10 - Abnormal Female indicating possible myocardial injury.  >=15 - Abnormal Male indicating possible myocardial injury.   Clinicians would have to utilize clinical acumen, EKG, Troponin, and serial changes to determine if it is an Acute Myocardial Infarction or myocardial injury due to an underlying chronic condition.        HEMOGLOBIN A1C - Abnormal; Notable for the following components:    Hemoglobin A1C 6.40 (*)     All other components within normal limits    Narrative:     Hemoglobin A1C Ranges:    Increased Risk for Diabetes  5.7% to 6.4%  Diabetes                     >= 6.5%  Diabetic Goal                < 7.0%   LIPID PANEL - Abnormal; Notable for the following components:    Triglycerides 631 (*)     HDL Cholesterol 26 (*)      VLDL Cholesterol 95 (*)     All other components within normal limits    Narrative:     Cholesterol Reference Ranges  (U.S. Department of Health and Human Services ATP III Classifications)    Desirable          <200 mg/dL  Borderline High    200-239 mg/dL  High Risk          >240 mg/dL      Triglyceride Reference Ranges  (U.S. Department of Health and Human Services ATP III Classifications)    Normal           <150 mg/dL  Borderline High  150-199 mg/dL  High             200-499 mg/dL  Very High        >500 mg/dL    HDL Reference Ranges  (U.S. Department of Health and Human Services ATP III Classifications)    Low     <40 mg/dl (major risk factor for CHD)  High    >60 mg/dl ('negative' risk factor for CHD)        LDL Reference Ranges  (U.S. Department of Health and Human Services ATP III Classifications)    Optimal          <100 mg/dL  Near Optimal     100-129 mg/dL  Borderline High  130-159 mg/dL  High             160-189 mg/dL  Very High        >189 mg/dL   COVID-19 AND FLU A/B, NP SWAB IN TRANSPORT MEDIA 8-12 HR TAT - Normal    Narrative:     Fact sheet for providers: https://www.fda.gov/media/429169/download    Fact sheet for patients: https://www.fda.gov/media/186430/download    Test performed by PCR.   TROPONIN - Normal    Narrative:     High Sensitive Troponin T Reference Range:  <10.0 ng/L- Negative Female for AMI  <15.0 ng/L- Negative Male for AMI  >=10 - Abnormal Female indicating possible myocardial injury.  >=15 - Abnormal Male indicating possible myocardial injury.   Clinicians would have to utilize clinical acumen, EKG, Troponin, and serial changes to determine if it is an Acute Myocardial Infarction or myocardial injury due to an underlying chronic condition.        BNP (IN-HOUSE) - Normal    Narrative:     This assay is used as an aid in the diagnosis of individuals suspected of having heart failure. It can be used as an aid in the diagnosis of acute decompensated heart failure (ADHF) in patients  presenting with signs and symptoms of ADHF to the emergency department (ED). In addition, NT-proBNP of <300 pg/mL indicates ADHF is not likely.    Age Range Result Interpretation  NT-proBNP Concentration (pg/mL:      <50             Positive            >450                   Gray                 300-450                    Negative             <300    50-75           Positive            >900                  Gray                300-900                  Negative            <300      >75             Positive            >1800                  Gray                300-1800                  Negative            <300   LIPASE - Normal   MAGNESIUM - Normal   CBC AND DIFFERENTIAL    Narrative:     The following orders were created for panel order CBC & Differential.  Procedure                               Abnormality         Status                     ---------                               -----------         ------                     CBC Auto Differential[[id number]]        Abnormal            Final result                 Please view results for these tests on the individual orders.        Imaging Ordered:   XR Chest 1 View    (Results Pending)       Internal chart review:   History reviewed. No pertinent past medical history.    History reviewed. No pertinent surgical history.    No Known Allergies      Current Facility-Administered Medications:     adenosine (ADENOCARD) injection 6 mg, 6 mg, Intravenous, Once PRN, Juan Mathews MD    [COMPLETED] Insert Peripheral IV, , , Once **AND** sodium chloride 0.9 % flush 10 mL, 10 mL, Intravenous, PRN, Juan Mathews MD    My EKG interpretation: Supraventricular tachycardia with a rate of 211.  No acute ST elevations ST depressions or T wave inversions.    My lab interpretation: Initial high-sensitivity troponin negative with a repeat 2 hours of 42 with obvious positive delta.  Elevated Leukos but normal hemoglobin A1c.  Lipid panel with elevated triglycerides but  unremarkable cholesterol.  CBC with normal hemoglobin.  CMP unremarkable.  Negative magnesium.  Negative lipase.  Normal BNP.    My imaging interpretation: Chest x-ray with no acute findings.    Decision rules/scores evaluated: HEART score 4.      Discussed with: the on call cardiologist, Dr. Daniel.  He stated that troponin elevation likely demand ischemia in the setting of symptoms felt to be reasonable to observation admit patient or alternatively have patient follow-up closely with cardiology as an outpatient initiate metoprolol and aspirin therapy.    Shared decision making: Discussed cardiology recommendation with the patient offered him both options.  Patient understood the risks of discharge and after discussion with him and his wife want to stay for admission which I think is best for this patient.    ED Course and Re-evaluation: 52yo M presenting with sudden onset chest pain and shortness of breath in the setting of palpitations.  Patient with clear supraventricular tachycardia on arrival.  Patient given aspirin given chest pain.  Send like he had this happen a couple times was never presented for care as it is spontaneous resolved.  Suspect that his chest pain shortness of breath are secondary to his significant tachycardia with demand ischemia.  Attempted modified Valsalva with no success so initiated chemical cardioversion both 6 mg and 12 mg of adenosine were given with no success.  Procedural sedation was performed with etomidate with electrical cardioversion with shock at 200 J with immediate conversion normal sinus rhythm.  Following procedure patient's symptoms have completely resolved.  Initial has not troponin is negative but repeat is now 42.  Would consider demand ischemia but given patient's diaphoretic status is elevated triglycerides and risk factors concerns for undiagnosed coronary artery disease.  Discussed the on-call cardiologist who was agreeable to observation admission versus discharge  pending patient preference patient wanted to be admitted which I think is most appropriate for this patient and he was admitted to the cardiology service.     ED Diagnosis:  Palpitations; Acute chest pain; Shortness of breath; Supraventricular tachycardia; Diaphoresis; High blood triglycerides; Elevated troponin    Disposition: Admit to cardiology        Signed:  Juan Mathews MD  Emergency Medicine Physician    Please note that portions of this note were completed with a voice recognition program.      Juan Mathews MD  10/20/23 0631       Juan Mathews MD  10/20/23 0631

## 2023-10-20 NOTE — ED NOTES
Nursing report ED to floor  Omer Hannon  53 y.o.  male    HPI:   Chief Complaint   Patient presents with    Rapid Heart Rate       Admitting doctor:   Javier Daniel MD    Consulting provider(s):  Consults       No orders found for last 30 day(s).             Admitting diagnosis:   The primary encounter diagnosis was Supraventricular tachycardia. Diagnoses of Palpitations, Acute chest pain, Shortness of breath, Diaphoresis, High blood triglycerides, and Elevated troponin were also pertinent to this visit.    Code status:   Current Code Status       Date Active Code Status Order ID Comments User Context       Not on file            Allergies:   Patient has no known allergies.    Intake and Output    Intake/Output Summary (Last 24 hours) at 10/20/2023 0459  Last data filed at 10/20/2023 0135  Gross per 24 hour   Intake 1000 ml   Output --   Net 1000 ml       Weight:       10/19/23  2338   Weight: 124 kg (274 lb)       Most recent vitals:   Vitals:    10/20/23 0156 10/20/23 0236 10/20/23 0316 10/20/23 0446   BP: 140/96 130/95 133/89 140/88   BP Location:       Patient Position:       Pulse: 76 91 83 72   Resp:       Temp:       TempSrc:       SpO2: 96% 93% 96% 94%   Weight:       Height:         Oxygen Therapy: .    Active LDAs/IV Access:   Lines, Drains & Airways       Active LDAs       Name Placement date Placement time Site Days    Peripheral IV 10/19/23 2346 Anterior;Distal;Left;Upper Arm 10/19/23  2346  Arm  less than 1                    Labs (abnormal labs have a star):   Labs Reviewed   COMPREHENSIVE METABOLIC PANEL - Abnormal; Notable for the following components:       Result Value    Glucose 204 (*)     All other components within normal limits    Narrative:     GFR Normal >60  Chronic Kidney Disease <60  Kidney Failure <15     CBC WITH AUTO DIFFERENTIAL - Abnormal; Notable for the following components:    WBC 11.19 (*)     All other components within normal limits   MANUAL DIFFERENTIAL - Abnormal;  Notable for the following components:    Lymphocytes Absolute 4.88 (*)     All other components within normal limits   HIGH SENSITIVITIY TROPONIN T 2HR - Abnormal; Notable for the following components:    HS Troponin T 42 (*)     All other components within normal limits    Narrative:     High Sensitive Troponin T Reference Range:  <10.0 ng/L- Negative Female for AMI  <15.0 ng/L- Negative Male for AMI  >=10 - Abnormal Female indicating possible myocardial injury.  >=15 - Abnormal Male indicating possible myocardial injury.   Clinicians would have to utilize clinical acumen, EKG, Troponin, and serial changes to determine if it is an Acute Myocardial Infarction or myocardial injury due to an underlying chronic condition.        HEMOGLOBIN A1C - Abnormal; Notable for the following components:    Hemoglobin A1C 6.40 (*)     All other components within normal limits    Narrative:     Hemoglobin A1C Ranges:    Increased Risk for Diabetes  5.7% to 6.4%  Diabetes                     >= 6.5%  Diabetic Goal                < 7.0%   LIPID PANEL - Abnormal; Notable for the following components:    Triglycerides 631 (*)     HDL Cholesterol 26 (*)     VLDL Cholesterol 95 (*)     All other components within normal limits    Narrative:     Cholesterol Reference Ranges  (U.S. Department of Health and Human Services ATP III Classifications)    Desirable          <200 mg/dL  Borderline High    200-239 mg/dL  High Risk          >240 mg/dL      Triglyceride Reference Ranges  (U.S. Department of Health and Human Services ATP III Classifications)    Normal           <150 mg/dL  Borderline High  150-199 mg/dL  High             200-499 mg/dL  Very High        >500 mg/dL    HDL Reference Ranges  (U.S. Department of Health and Human Services ATP III Classifications)    Low     <40 mg/dl (major risk factor for CHD)  High    >60 mg/dl ('negative' risk factor for CHD)        LDL Reference Ranges  (U.S. Department of Health and Human Services ATP  III Classifications)    Optimal          <100 mg/dL  Near Optimal     100-129 mg/dL  Borderline High  130-159 mg/dL  High             160-189 mg/dL  Very High        >189 mg/dL   COVID-19 AND FLU A/B, NP SWAB IN TRANSPORT MEDIA 8-12 HR TAT - Normal    Narrative:     Fact sheet for providers: https://www.fda.gov/media/469268/download    Fact sheet for patients: https://www.fda.gov/media/599684/download    Test performed by PCR.   TROPONIN - Normal    Narrative:     High Sensitive Troponin T Reference Range:  <10.0 ng/L- Negative Female for AMI  <15.0 ng/L- Negative Male for AMI  >=10 - Abnormal Female indicating possible myocardial injury.  >=15 - Abnormal Male indicating possible myocardial injury.   Clinicians would have to utilize clinical acumen, EKG, Troponin, and serial changes to determine if it is an Acute Myocardial Infarction or myocardial injury due to an underlying chronic condition.        BNP (IN-HOUSE) - Normal    Narrative:     This assay is used as an aid in the diagnosis of individuals suspected of having heart failure. It can be used as an aid in the diagnosis of acute decompensated heart failure (ADHF) in patients presenting with signs and symptoms of ADHF to the emergency department (ED). In addition, NT-proBNP of <300 pg/mL indicates ADHF is not likely.    Age Range Result Interpretation  NT-proBNP Concentration (pg/mL:      <50             Positive            >450                   Gray                 300-450                    Negative             <300    50-75           Positive            >900                  Gray                300-900                  Negative            <300      >75             Positive            >1800                  Gray                300-1800                  Negative            <300   LIPASE - Normal   MAGNESIUM - Normal   CBC AND DIFFERENTIAL    Narrative:     The following orders were created for panel order CBC & Differential.  Procedure                                Abnormality         Status                     ---------                               -----------         ------                     CBC Auto Differential[[id number]]        Abnormal            Final result                 Please view results for these tests on the individual orders.       Meds given in ED:   Medications   sodium chloride 0.9 % flush 10 mL (has no administration in time range)   adenosine (ADENOCARD) injection 6 mg (has no administration in time range)   lactated ringers bolus 1,000 mL (0 mL Intravenous Stopped 10/20/23 0135)   aspirin chewable tablet 324 mg (324 mg Oral Given 10/20/23 0001)   adenosine (ADENOCARD) injection 12 mg (12 mg Intravenous Given 10/19/23 2355)   etomidate (AMIDATE) injection 18 mg (18 mg Intravenous Given 10/20/23 0017)           NIH Stroke Scale:       Isolation/Infection(s):  No active isolations   COVID (rule out)     COVID Testing  Collected .  Resulted .    Nursing report ED to floor:  Mental status: .  Ambulatory status: .  Precautions: .    ED nurse phone extentsion- ..

## 2023-10-20 NOTE — PLAN OF CARE
Goal Outcome Evaluation:              Outcome Evaluation: pt arrived on unit. HR 80's, stable. a&o x4, RA, denies pain.

## 2023-10-20 NOTE — ED PROCEDURE NOTE
Place of Service:[facility] EMERGENCY DEPARTMENT  Patient Name:Omer Hannon  :1970    Procedure  Procedural Sedation    Date/Time: 10/20/2023 12:00 AM    Performed by: Juan Mathews MD  Authorized by: Juan Mathews MD    Consent:     Consent obtained:  Verbal and written    Consent given by:  Patient and spouse    Risks, benefits, and alternatives were discussed: yes      Risks discussed:  Allergic reaction, dysrhythmia, inadequate sedation, nausea, vomiting and respiratory compromise necessitating ventilatory assistance and intubation  Universal protocol:     Procedure explained and questions answered to patient or proxy's satisfaction: yes      Imaging studies available: yes      Immediately prior to procedure, a time out was called: yes      Patient identity confirmed:  Verbally with patient and arm band  Indications:     Procedure performed:  Cardioversion    Procedure necessitating sedation performed by:  Physician performing sedation    Intended level of sedation:  Moderate  Pre-sedation assessment:     Time since last food or drink:  6h    ASA classification: class 2 - patient with mild systemic disease      Mouth openin finger widths    Thyromental distance:  2 finger widths    Mallampati score:  II - soft palate, uvula, fauces visible    Neck mobility: normal      Pre-sedation assessments completed and reviewed: airway patency, anesthesia/sedation history, cardiovascular function, hydration status, mental status, nausea/vomiting, pain level, respiratory function and temperature      History of difficult intubation: no      Pre-sedation assessment completed:  10/20/2023 12:01 AM  Immediate pre-procedure details:     Reassessment: Patient reassessed immediately prior to procedure      Reviewed: vital signs, relevant labs/tests and NPO status      Verified: bag valve mask available, emergency equipment available, intubation equipment available, IV patency confirmed, oxygen  available and reversal medications available    Procedure details (see MAR for exact dosages):     Sedation start time:  10/20/2023 12:16 AM    Preoxygenation:  Nasal cannula    Sedation:  Etomidate    Analgesia:  None    Intra-procedure monitoring:  Blood pressure monitoring, cardiac monitor, continuous pulse oximetry, continuous capnometry, frequent LOC assessments and frequent vital sign checks    Intra-procedure events: none      Sedation end time:  10/20/2023 12:30 AM    Total sedation time (minutes):  14  Post-procedure details:     Post-sedation assessment completed:  10/20/2023 12:35 AM    Attendance: Constant attendance by certified staff until patient recovered      Recovery: Patient returned to pre-procedure baseline      Estimated blood loss (see I/O flowsheets): no      Post-sedation assessments completed and reviewed: airway patency, cardiovascular function, hydration status, mental status, nausea/vomiting, pain level, respiratory function and temperature      Specimens recovered:  None    Patient is stable for discharge or admission: yes      Procedure completion:  Tolerated well, no immediate complications            Juan Mathews MD  10/20/23 0054

## 2023-10-21 LAB
QT INTERVAL: 230 MS
QT INTERVAL: 314 MS
QTC INTERVAL: 390 MS
QTC INTERVAL: 431 MS

## 2023-10-21 NOTE — DISCHARGE SUMMARY
[facility] DISCHARGE    Date of Discharge:  10/20/2023    Discharge Diagnosis:   1.  Sustained supraventricular tachycardia  2.  Type II non-STEMI, secondary to #1 above    Presenting Problem/History of Present Illness  (As per HPI as dictated by MANUELA Jackson).  Omer Hannon is a 53 y.o. male presents in sustained SVT.  The patient reports he was sitting at home on his computer last night when he developed palpitations, lightheadedness, diaphoresis, mild dyspnea and chest tightness.  He states he checked his heart rate with a home monitor and it was noted to be 200 so he presented to the ER.  He does tell me since he was in his 30s he has been experiencing intermittent flutters that typically only last a few seconds and occur after consuming large amounts of caffeine.  He states this is the first time the palpitations have sustained.  He does report when the potation started last night he was actively drinking Mountain Dew and had probably had half of a 2 L at that point.     When he presented to the ER he was found to be in SVT with a heart rate around 210.  He was treated with adenosine 6 mg followed by adenosine 12 mg.  He did not convert to normal sinus rhythm with this.  He was ultimately cardioverted successfully at 1201 this morning.  Since that time he has maintained normal sinus rhythm with heart rates in the 60s to 80s.  He does tell me his symptoms completely resolved with restoration of normal sinus rhythm.     When questioned about any other symptoms lately, he states he might be a little more tired than usual but he relates this to his poor sleeping habits.  He states he does not sleep well or sleep much.  Otherwise, he denies any chest discomfort or shortness of breath outside of what he experienced with the SVT.     His initial troponin was less than 6.  His follow-up troponin was 42.     It appears he did receive 1 L of lactated Ringer's and full dose aspirin  slightly after midnight.     His chest x-ray suggest a possible left lung infiltrate.  He denies fever, cough, chills, dyspnea outside of what he experienced with the SVT.     He denies any prior cardiac history.     EKG restoration of normal sinus rhythm at 00 18 revealed possible inferior Q waves and anterolateral ST depression.    Hospital Course  After restoring sinus rhythm in the ER and transferred to telemetry for observation, patient had no more recurrences of SVT.  Echocardiogram, as noted below, showed a structurally normal heart.  We discussed trigger avoidance, as well as initiation of low-dose beta-blocker therapy for suppression.  He has remained asymptomatic, pain-free, and is ready for discharge.  Troponin elevation in the ER not reflective of acute coronary syndrome, but rather a type II non-STEMI secondary to sustained arrhythmia.    I have advised that he return next week for an outpatient rhythm monitoring device, and can follow-up with us in 1 month.  At that point in time, we will consider further restratification with an ischemic study, particular if he has had any other exertional-limiting symptoms in the meantime.    Procedures Performed  -External cardioversion (in the ED, by ED provider)       Consults:   Consults       No orders found for last 30 day(s).            Pertinent Test Results:   Results for orders placed during the hospital encounter of 10/19/23    Adult Transthoracic Echo Complete W/ Cont if Necessary Per Protocol    Interpretation Summary    Left ventricular systolic function is normal. Left ventricular ejection fraction appears to be 61 - 65%.    Left ventricular diastolic function was normal.    Estimated right ventricular systolic pressure from tricuspid regurgitation is normal (<35 mmHg).    Normal size and function right ventricle.    No significant valvular pathology.    No previous studies available for comparison.      Condition on Discharge: Stable    Physical Exam  at Discharge    Vital Signs  Temp:  [97.9 °F (36.6 °C)-98.2 °F (36.8 °C)] 98.1 °F (36.7 °C)  Heart Rate:  [] 76  Resp:  [13-20] 18  BP: ()/(60-99) 139/86    Physical Exam:  Vitals and nursing note reviewed.   Constitutional:       General: Not in acute distress.     Appearance: Not in distress.   Neck:      Vascular: No JVD or JVR. JVD normal.   Pulmonary:      Effort: Pulmonary effort is normal.      Breath sounds: Normal breath sounds.   Cardiovascular:      Normal rate. Regular rhythm.      Murmurs: There is no murmur.      No gallop.  No rub.   Pulses:     Intact distal pulses.   Edema:     Peripheral edema absent.   Skin:     General: Skin is warm and dry.   Neurological:      Mental Status: Alert, oriented to person, place, and time and oriented to person, place and time.         Discharge Disposition  Home or Self Care    Discharge Medications     Discharge Medications        New Medications        Instructions Start Date   metoprolol succinate XL 25 MG 24 hr tablet  Commonly known as: TOPROL-XL   25 mg, Oral, Every 24 Hours Scheduled   Start Date: October 21, 2023            Continue These Medications        Instructions Start Date   ofloxacin 0.3 % otic solution  Commonly known as: Floxin Otic   5 drops in draining ear BID x 7 days      sildenafil 100 MG tablet  Commonly known as: Viagra   100 mg, Oral, Daily PRN               Discharge Diet: Heart healthy    Activity at Discharge: Ad rebel.  Did discuss trigger avoidance for SVT, particularly significant reduction in caffeine.    Follow-up Appointments  Cardiology clinic in 4-6 weeks, with MANUELA Jackson, or Cristian Lee MD      Test Results Pending at Discharge: none     Same day admit/discharge (observation)     Cristian Lee MD  10/20/23  23:33 CDT

## 2023-10-24 LAB
QT INTERVAL: 374 MS
QTC INTERVAL: 431 MS

## 2023-11-22 ENCOUNTER — OFFICE VISIT (OUTPATIENT)
Dept: CARDIOLOGY | Facility: CLINIC | Age: 53
End: 2023-11-22
Payer: COMMERCIAL

## 2023-11-22 VITALS
WEIGHT: 273 LBS | HEIGHT: 71 IN | HEART RATE: 66 BPM | OXYGEN SATURATION: 99 % | SYSTOLIC BLOOD PRESSURE: 154 MMHG | BODY MASS INDEX: 38.22 KG/M2 | DIASTOLIC BLOOD PRESSURE: 102 MMHG

## 2023-11-22 DIAGNOSIS — R79.89 ELEVATED TROPONIN: ICD-10-CM

## 2023-11-22 DIAGNOSIS — I47.10 SUSTAINED SVT: Primary | ICD-10-CM

## 2023-11-22 DIAGNOSIS — R03.0 ELEVATED BLOOD PRESSURE READING IN OFFICE WITHOUT DIAGNOSIS OF HYPERTENSION: ICD-10-CM

## 2023-11-22 PROCEDURE — 93000 ELECTROCARDIOGRAM COMPLETE: CPT | Performed by: INTERNAL MEDICINE

## 2023-11-22 PROCEDURE — 99214 OFFICE O/P EST MOD 30 MIN: CPT | Performed by: INTERNAL MEDICINE

## 2023-11-22 NOTE — PROGRESS NOTES
Subjective:     Encounter Date:11/22/2023      Patient ID: Omer Hannon is a 53 y.o. male.    Chief Complaint: Follow-up hospitalization for SVT  History of Present Illness    Mr. Anguiano is a 53-year-old male who is briefly admitted to the hospital October 2023, presenting with sustained episode of palpitations and associated lightheadedness, diaphoresis, chest tightness, and dyspnea.  He reported he was actively drinking Mountain Dew when this happened and probably had consumed about 2 L by that point.  Upon arrival in the ER, he was noted to be in SVT with a heart rate of about 210 bpm.  Initial pushes of adenosine did not restore sinus rhythm, so he was externally cardioverted by the ED provider.  He maintained sinus rhythm thereafter throughout the night of observation on telemetry, with sinus rhythm and rates in the 60s-80s.  His initial troponin was undetectable with follow-up 42.  We felt like this was a type II non-STEMI secondary to sustained SVT and therefore did not pursue any further inpatient ischemic evaluation.  He was discharged home with instructions to return for an outpatient rhythm monitoring device to be placed the following week, with plans to consider an ischemic evaluation as an outpatient if he has any exertional limiting symptoms in the meantime that are not in the context of a sustained arrhythmia.  Echocardiogram performed while inpatient showed a structurally normal heart.  He was discharged home on low-dose beta-blocker.  He returns today for routine follow-up.    Mr. Omer Hannon comes to the clinic today stating that he has been doing well since being seen in the hospital. He reports that he wore the Zio patch, and he does not remember hitting it. He states that he did not have any symptoms during that time. He reports that he did not feel the rapid heartbeats.    The patient reports that he is doing well on the metoprolol, but thinks it is making him feel tired, and  he seems to be falling asleep in the evening. He reports that he takes it in the morning, but he does not feel too tired during the day. He denies any other symptoms since he left the hospital. He denies wearing out unusually quickly, becoming short of breath, or any chest discomfort.    The patient reports that he has never been told that he has high blood pressure in the past. He reports that he has checked it a couple of times over the last year, and it has been in the 140s/80s mmHg. He reports that his wife has told him that he snores.      The following portions of the patient's history were reviewed and updated as appropriate: allergies, current medications, past family history, past medical history, past social history, past surgical history, and problem list.    Review of Systems   Constitutional: Negative for malaise/fatigue.   Cardiovascular:  Negative for chest pain, claudication, dyspnea on exertion, leg swelling, near-syncope, orthopnea, palpitations, paroxysmal nocturnal dyspnea and syncope.   Respiratory:  Negative for shortness of breath.    Hematologic/Lymphatic: Does not bruise/bleed easily.           Current Outpatient Medications:     sildenafil (Viagra) 100 MG tablet, Take 1 tablet by mouth Daily As Needed for Erectile Dysfunction., Disp: 30 tablet, Rfl: 0       Objective:      Vitals:    11/22/23 1252   BP: (!) 154/102   Pulse: 66   SpO2: 99%     Vitals and nursing note reviewed.   Constitutional:       General: Not in acute distress.     Appearance: Not in distress.   Neck:      Vascular: No JVD or JVR. JVD normal.   Pulmonary:      Effort: Pulmonary effort is normal.      Breath sounds: Normal breath sounds.   Cardiovascular:      Normal rate. Regular rhythm.      Murmurs: There is no murmur.      No gallop.  No rub.   Pulses:     Intact distal pulses.   Edema:     Peripheral edema absent.   Skin:     General: Skin is warm and dry.   Neurological:      Mental Status: Alert, oriented to  person, place, and time and oriented to person, place and time.       Lab Review:         ECG 12 Lead    Date/Time: 11/22/2023 12:57 PM  Performed by: Cristian Lee MD    Authorized by: Cristian Lee MD  Comparison: compared with previous ECG from 10/20/2023  Similar to previous ECG  Rhythm: sinus bradycardia  BPM: 59  Other findings: T wave abnormality  Clinical impression comment: borderline EKG        Results for orders placed during the hospital encounter of 10/19/23    Adult Transthoracic Echo Complete W/ Cont if Necessary Per Protocol    Interpretation Summary    Left ventricular systolic function is normal. Left ventricular ejection fraction appears to be 61 - 65%.    Left ventricular diastolic function was normal.    Estimated right ventricular systolic pressure from tricuspid regurgitation is normal (<35 mmHg).    Normal size and function right ventricle.    No significant valvular pathology.    No previous studies available for comparison.        Assessment/Plan:     Problem List Items Addressed This Visit          Cardiac and Vasculature    Elevated troponin    Sustained SVT - Primary     Other Visit Diagnoses       Elevated blood pressure reading in office without diagnosis of hypertension        Relevant Orders    Ambulatory Referral to Internal Medicine              Recommendations/plans:    1. Sustained SVT: The patient has had no more symptoms, and certainly no sustained events since hospital stay, and starting low-dose beta-blocker. His ZIO patch was reviewed today, and overall relatively benign. He does not even really recall triggering the device for the short burst of 10 consecutive PACs/short SVT that was seen. We discussed numerous different treatment options including continuing current metoprolol, versus stopping it, and referring to EP for ablation consideration, versus stopping it, and monitoring for symptoms with the understanding to avoid excessive caffeine (since this was  implicated in his initial event).  - After thoroughly discussing all the above, he has opted to stop metoprolol at this time, and just monitor for symptoms. He understands to avoid excessive caffeine.  - We will plan on seeing him back in 6 months to assess for any other symptomatic palpitations, and advised that he call in the meantime should he have any sustained events. We also reviewed Valsalva maneuvers, and other abortive behaviors that he could try should he have another sustained event.    2. Elevated blood pressure without previous diagnosis of hypertension: On metoprolol, the patient's pressure is elevated today. As noted above, we are stopping metoprolol due to several reasons, one of which is perhaps lack of need for overall suppression of the arrhythmia, and the other, which is side effect of increased fatigue he is experiencing. He does not identify a primary care doctor, so I have referred him to Dr. Kushal Franklin, and advised him to monitor his blood pressure in the meantime to review this with Dr. Franklin when he establishes care with him for both this, and for ongoing surveillance for primary preventative healthcare.    3. Elevated troponin: Type 2 MI secondary to sustained arrhythmia. He has had no exertional dyspnea or chest discomfort since then to merit further evaluation at this point. He needs to maintain focus on primary prevention including weight loss, heart-healthy eating, and risk factor modification as will be provided through Dr. Franklin.    Follow up in 6 months or sooner with new or worsening symptoms.        Transcribed from ambient dictation for Cristian Lee MD by Angel Tolbert.  11/22/23   15:18 CST    Patient or patient representative verbalized consent to the visit recording.    I Cristian Lee MD have personally performed the services described in this document as scribed by the above individual, and it is both accurate and complete.   I have edited each component as  needed.    Crsitian Lee MD  11/23/2023  12:35 CST

## 2023-11-29 ENCOUNTER — OFFICE VISIT (OUTPATIENT)
Dept: INTERNAL MEDICINE | Facility: CLINIC | Age: 53
End: 2023-11-29
Payer: COMMERCIAL

## 2023-11-29 VITALS
WEIGHT: 266 LBS | BODY MASS INDEX: 37.24 KG/M2 | DIASTOLIC BLOOD PRESSURE: 104 MMHG | OXYGEN SATURATION: 97 % | TEMPERATURE: 97.5 F | HEART RATE: 83 BPM | SYSTOLIC BLOOD PRESSURE: 170 MMHG | HEIGHT: 71 IN

## 2023-11-29 DIAGNOSIS — E78.2 MIXED HYPERLIPIDEMIA: ICD-10-CM

## 2023-11-29 DIAGNOSIS — Z12.5 PROSTATE CANCER SCREENING: ICD-10-CM

## 2023-11-29 DIAGNOSIS — R73.03 PRE-DIABETES: ICD-10-CM

## 2023-11-29 DIAGNOSIS — Z12.11 SCREENING FOR COLON CANCER: ICD-10-CM

## 2023-11-29 DIAGNOSIS — Z76.89 ENCOUNTER TO ESTABLISH CARE: Primary | ICD-10-CM

## 2023-11-29 DIAGNOSIS — I10 ESSENTIAL HYPERTENSION: ICD-10-CM

## 2023-11-29 DIAGNOSIS — E66.9 OBESITY (BMI 30-39.9): ICD-10-CM

## 2023-11-29 RX ORDER — CARVEDILOL 6.25 MG/1
6.25 TABLET ORAL 2 TIMES DAILY WITH MEALS
Qty: 60 TABLET | Refills: 1 | Status: SHIPPED | OUTPATIENT
Start: 2023-11-29

## 2023-11-29 RX ORDER — LOSARTAN POTASSIUM 25 MG/1
25 TABLET ORAL DAILY
Qty: 30 TABLET | Refills: 1 | Status: SHIPPED | OUTPATIENT
Start: 2023-11-29

## 2023-11-29 NOTE — PROGRESS NOTES
"    Chief Complaint  Establish Care (Pt wants to discuss recent visit to ED 10/19/23. /) and Hypertension (Pt expresses concern. )    Subjective        Omer Hannon presents to [facility]  History of Present Illness  See below.    Objective   Vital Signs:  BP (!) 170/104 (BP Location: Left arm, Patient Position: Sitting, Cuff Size: Adult)   Pulse 83   Temp 97.5 °F (36.4 °C) (Infrared)   Ht 180.3 cm (71\")   Wt 121 kg (266 lb)   SpO2 97%   BMI 37.10 kg/m²   Estimated body mass index is 37.1 kg/m² as calculated from the following:    Height as of this encounter: 180.3 cm (71\").    Weight as of this encounter: 121 kg (266 lb).       Class 2 Severe Obesity (BMI >=35 and <=39.9). Obesity-related health conditions include the following: hypertension. Obesity is worsening. BMI is is above average; BMI management plan is completed. We discussed portion control and increasing exercise.    Physical Exam  Constitutional:       Appearance: He is obese. He is not ill-appearing.   HENT:      Head: Normocephalic and atraumatic.   Eyes:      Conjunctiva/sclera: Conjunctivae normal.      Pupils: Pupils are equal, round, and reactive to light.   Cardiovascular:      Rate and Rhythm: Normal rate and regular rhythm.      Heart sounds: Normal heart sounds.   Pulmonary:      Effort: Pulmonary effort is normal. No respiratory distress.      Breath sounds: Normal breath sounds.   Musculoskeletal:         General: No swelling.      Cervical back: Neck supple.   Skin:     General: Skin is warm and dry.      Findings: No rash.   Neurological:      General: No focal deficit present.      Mental Status: He is alert and oriented to person, place, and time.   Psychiatric:         Mood and Affect: Mood normal.         Behavior: Behavior normal.         Thought Content: Thought content normal.         Judgment: Judgment normal.      Comments: Seems a bit anxious at times.        Result Review :  Results for " orders placed during the hospital encounter of 10/19/23    Adult Transthoracic Echo Complete W/ Cont if Necessary Per Protocol    Interpretation Summary    Left ventricular systolic function is normal. Left ventricular ejection fraction appears to be 61 - 65%.    Left ventricular diastolic function was normal.    Estimated right ventricular systolic pressure from tricuspid regurgitation is normal (<35 mmHg).    Normal size and function right ventricle.    No significant valvular pathology.    No previous studies available for comparison.    TSH was normal at 2.110 on 10/20.  Hemoglobin A1c 6.4 on 10/19.  Lipid showed total cholesterol 181, HDL 26, LDL 60, triglycerides 631.  CMP on 10/19 was unremarkable with the exception of a glucose of 204.  CBC on 10/19 was unremarkable.    1 view chest x-ray on 10/20 showed an ill-defined opacity in the left midlung may represent an area of discoid atelectasis or evolving infiltrate.  Further follow-up is recommended.    14-day Zio patch was read on 11/22 and was a relatively benign monitor study.  Predominant rhythm is normal sinus rhythm.  No sustained arrhythmias.  2 short runs of SVT. Patient triggered the device 3 times, but did not report symptoms.  1 of these 3 occasions was immediately following a short run of SVT, 9 consecutive beats.         Assessment and Plan   Diagnoses and all orders for this visit:    1. Encounter to establish care (Primary)    2. Essential hypertension  -     Urinalysis With Microscopic - Urine, Clean Catch; Future  -     Basic metabolic panel; Future  -     carvedilol (Coreg) 6.25 MG tablet; Take 1 tablet by mouth 2 (Two) Times a Day With Meals.  Dispense: 60 tablet; Refill: 1  -     losartan (Cozaar) 25 MG tablet; Take 1 tablet by mouth Daily.  Dispense: 30 tablet; Refill: 1    3. Pre-diabetes    4. Obesity (BMI 30-39.9)    5. Mixed hyperlipidemia    6. Prostate cancer screening  -     PSA Screen; Future    7. Screening for colon cancer  -      Cologuard - Stool, Per Rectum; Future    Presents today to establish care.  Referred by Dr. Lee with cardiology.    The patient was admitted to the hospital from 10/19-10/20.  He presented with sustained supraventricular tachycardia and had an elevated troponin as a result.  He was treated with adenosine and in the emergency department with slowing of his heart rate, but continued in the rhythm.  He ultimately required cardioversion the following day.  Troponin was trended. He had an echocardiogram as above.  He was placed on metoprolol succinate and discharged for follow-up.      He saw Dr. Lee back in the office on 11/22 after having a Zio patch done.  This was a fairly benign study.  The patient had in the interim nearly completely avoided caffeine.  Prior to his episode of SVT, he had been consuming large amounts of caffeine on a daily basis.  He estimated that he had been drinking several 2 L of Mountain Dew a day.  He reported some fatigue on metoprolol succinate so he was taken off of it.  However, he was found to have continued elevated blood pressure and Dr. Lee' office so he has sent him to see me to treat this and also set up primary care.    I would like to place him on carvedilol and losartan today.  We can titrate as needed.  He was given a blood pressure log.  He does have a cuff at home.  He does admit that it has been running consistently hypertensive since he started checking a month ago.  He feels his pressure is higher than it has been in the office today because he is nervous about the appointment.  I would like to have him back in 1 month to check on his progress.    It was also pointed out to him that he is prediabetic.  Hemoglobin A1c is 6.4.  He realizes that a lot of his sugar intake was related to soft drinks.  He has nearly completely cut these out.  He inquired about starting to drink diet soft drinks, but I pointed out to him that it was felt his arrhythmia problem was related to  excessive caffeine intake and he may wish to avoid soft drinks altogether diet or not.    Has consistently been gaining weight year-over-year.  Physical activity has lessened.  He realizes that his diet could be quite a bit better.  He plans to make adjustments and start to exercise again.  He travels for work and frequently has to drive up to nearly 200 miles a day.  He has 3 kids; 1 is in college, 1 is in high school, 1 is an elementary school.  He feels like he is very busy with him and this limits his ability to eat correctly and exercise as well.    Has a mixed hyperlipidemia, primarily hypertriglyceridemia.  Explained to him that working on his prediabetes and sugar intake would also significantly help this.  No plans to start medical treatment for this at the moment.  We will reassess his lipid panel around the April timeframe.  We will also reassess his hemoglobin A1c at that time as well.    Willing to screen PSA.    He has never had colorectal cancer screening.  Discussed the pros and cons of Cologuard versus colonoscopy.  He would like to use Cologuard for screening.  No family history of colon cancer.  No changes in bowel habits or bleeding.    He had the seasonal flu vaccine through work in October.    Plan to have him back in 1 month.  Again, we talked extensively about lifestyle modification to help all of his problems above.  Again, he was provided a hypertension log and he does have a cuff at home.       Follow Up   Return in about 4 weeks (around 12/27/2023) for Annual physical.  Patient was given instructions and counseling regarding his condition or for health maintenance advice. Please see specific information pulled into the AVS if appropriate.

## 2023-11-30 PROBLEM — E78.2 MIXED HYPERLIPIDEMIA: Status: ACTIVE | Noted: 2023-11-30

## 2023-11-30 PROBLEM — E66.9 OBESITY (BMI 30-39.9): Status: ACTIVE | Noted: 2023-11-30

## 2024-01-09 ENCOUNTER — OFFICE VISIT (OUTPATIENT)
Dept: INTERNAL MEDICINE | Facility: CLINIC | Age: 54
End: 2024-01-09
Payer: COMMERCIAL

## 2024-01-09 VITALS
SYSTOLIC BLOOD PRESSURE: 140 MMHG | HEART RATE: 56 BPM | HEIGHT: 71 IN | DIASTOLIC BLOOD PRESSURE: 98 MMHG | OXYGEN SATURATION: 97 % | BODY MASS INDEX: 36.54 KG/M2 | WEIGHT: 261 LBS | TEMPERATURE: 97.3 F

## 2024-01-09 DIAGNOSIS — I10 ESSENTIAL HYPERTENSION: ICD-10-CM

## 2024-01-09 DIAGNOSIS — Z00.00 ANNUAL PHYSICAL EXAM: Primary | ICD-10-CM

## 2024-01-09 DIAGNOSIS — E66.9 OBESITY (BMI 30-39.9): ICD-10-CM

## 2024-01-09 DIAGNOSIS — Z30.09 VASECTOMY EVALUATION: ICD-10-CM

## 2024-01-09 DIAGNOSIS — R73.03 PRE-DIABETES: ICD-10-CM

## 2024-01-09 DIAGNOSIS — Z11.59 ENCOUNTER FOR HEPATITIS C SCREENING TEST FOR LOW RISK PATIENT: ICD-10-CM

## 2024-01-09 RX ORDER — LOSARTAN POTASSIUM 50 MG/1
50 TABLET ORAL DAILY
Qty: 90 TABLET | Refills: 1 | Status: SHIPPED | OUTPATIENT
Start: 2024-01-09

## 2024-01-09 NOTE — PROGRESS NOTES
"    Chief Complaint  Annual Exam    Subjective        Omer Hannon presents to [facility]  History of Present Illness  See below.     Objective   Vital Signs:  /98 (BP Location: Left arm, Patient Position: Sitting, Cuff Size: Adult)   Pulse 56   Temp 97.3 °F (36.3 °C) (Infrared)   Ht 180.3 cm (71\")   Wt 118 kg (261 lb)   SpO2 97%   BMI 36.40 kg/m²   Estimated body mass index is 36.4 kg/m² as calculated from the following:    Height as of this encounter: 180.3 cm (71\").    Weight as of this encounter: 118 kg (261 lb).       Physical Exam  Constitutional:       Appearance: He is obese.      Comments: Wearing a [employer] sweatshirt which we joked about.    HENT:      Head: Normocephalic and atraumatic.   Eyes:      Conjunctiva/sclera: Conjunctivae normal.      Pupils: Pupils are equal, round, and reactive to light.   Cardiovascular:      Rate and Rhythm: Normal rate and regular rhythm.      Heart sounds: Normal heart sounds.   Pulmonary:      Effort: Pulmonary effort is normal. No respiratory distress.      Breath sounds: Normal breath sounds.   Musculoskeletal:         General: No swelling.      Cervical back: Neck supple.   Skin:     General: Skin is warm and dry.      Findings: No rash.   Neurological:      General: No focal deficit present.      Mental Status: He is alert and oriented to person, place, and time.   Psychiatric:         Mood and Affect: Mood normal.         Behavior: Behavior normal.         Thought Content: Thought content normal.         Judgment: Judgment normal.       Result Review :  Planning to check BMP and PSA today.    He has had a negative Cologuard since his last office visit.           Assessment and Plan   Diagnoses and all orders for this visit:    1. Annual physical exam (Primary)    2. Essential hypertension  -     losartan (Cozaar) 50 MG tablet; Take 1 tablet by mouth Daily.  Dispense: 90 tablet; Refill: 1    3. Pre-diabetes    4. Obesity " (BMI 30-39.9)    5. Vasectomy evaluation  -     Ambulatory Referral to Urology    6. Encounter for hepatitis C screening test for low risk patient  -     Hepatitis C antibody    Returns today for 1 month follow-up and yearly physical.    I first saw him on 11/29.  He was recommended to me by Dr. Lee.  Please see my prior office note for full details regarding the hospitalization with Dr. Lee for SVT.    No difficulty with palpitations or chest tightness.  He has largely given up caffeine.  Has an occasional diet soda.  He at one point was drinking at least 2 L of Mountain Dew per day.  It was felt that excessive caffeine intake provoked his episode of SVT.    I started him on carvedilol and losartan for hypertension on his last office visit.  His blood pressure has steadily improved since starting these medications.  However, he is still having systolic numbers in the 150s and 160s occasionally.  Blood pressure the last few days has been 134/84 and 139/89.  Do plan to increase his losartan to 50 mg daily today and keep his dose of carvedilol the same.    Not yet ready to recheck hemoglobin A1c.  This should improve as he is avoiding sugary drinks.     He has also been able to decrease his weight from 273 pounds to 261 pounds since late November.  This is largely secondary to adjusting his diet.    Interested in undergoing vasectomy.  Referral sent to Dr. Desai.    Plan to check PSA today.    Cologuard was negative in December.  Repeat in December 2026.    Up-to-date on seasonal vaccines.    Counseled on appropriate dental and vision care.  He has readers, but has not had a formal eye exam in some time.  Encouraged him to do so.  He actually went and saw Dr. Rider at [facility] this morning and is now up-to-date on that.    Plan to see him back in 3 months and if doing well at that time can likely stretch out to every 6 months.         Follow Up   Return in about 3 months (around 4/9/2024) for  Recheck.  Patient was given instructions and counseling regarding his condition or for health maintenance advice. Please see specific information pulled into the AVS if appropriate.      LILIA Franklin DO       Electronically signed by JANET Franklin DO, 01/09/24, 3:25 PM CST.

## 2024-01-10 LAB — HCV IGG SERPL QL IA: NON REACTIVE

## 2024-01-29 DIAGNOSIS — I10 ESSENTIAL HYPERTENSION: ICD-10-CM

## 2024-01-29 RX ORDER — LOSARTAN POTASSIUM 50 MG/1
50 TABLET ORAL DAILY
Qty: 90 TABLET | Refills: 3 | Status: SHIPPED | OUTPATIENT
Start: 2024-01-29

## 2024-02-02 ENCOUNTER — TELEPHONE (OUTPATIENT)
Dept: INTERNAL MEDICINE | Facility: CLINIC | Age: 54
End: 2024-02-02
Payer: COMMERCIAL

## 2024-02-02 DIAGNOSIS — I10 ESSENTIAL HYPERTENSION: ICD-10-CM

## 2024-02-02 RX ORDER — CARVEDILOL 6.25 MG/1
6.25 TABLET ORAL 2 TIMES DAILY WITH MEALS
Qty: 180 TABLET | Refills: 3 | Status: SHIPPED | OUTPATIENT
Start: 2024-02-02

## 2024-02-02 NOTE — TELEPHONE ENCOUNTER
Mr. Tello called about his Carvedilol. He is traveling this weekend, leaving [facility], and is requesting a refill at [facility].

## 2025-02-15 DIAGNOSIS — I10 ESSENTIAL HYPERTENSION: ICD-10-CM

## 2025-02-17 DIAGNOSIS — I10 ESSENTIAL HYPERTENSION: ICD-10-CM

## 2025-02-17 RX ORDER — LOSARTAN POTASSIUM 50 MG/1
50 TABLET ORAL DAILY
Qty: 30 TABLET | Refills: 0 | OUTPATIENT
Start: 2025-02-17

## 2025-02-17 RX ORDER — CARVEDILOL 6.25 MG/1
6.25 TABLET ORAL 2 TIMES DAILY WITH MEALS
Qty: 60 TABLET | Refills: 0 | OUTPATIENT
Start: 2025-02-17

## 2025-02-18 RX ORDER — CARVEDILOL 6.25 MG/1
6.25 TABLET ORAL 2 TIMES DAILY WITH MEALS
Qty: 180 TABLET | Refills: 3 | OUTPATIENT
Start: 2025-02-18

## 2025-02-18 RX ORDER — LOSARTAN POTASSIUM 50 MG/1
50 TABLET ORAL DAILY
Qty: 90 TABLET | Refills: 3 | OUTPATIENT
Start: 2025-02-18

## 2025-02-20 DIAGNOSIS — I10 ESSENTIAL HYPERTENSION: ICD-10-CM

## 2025-02-20 RX ORDER — CARVEDILOL 6.25 MG/1
6.25 TABLET ORAL 2 TIMES DAILY WITH MEALS
Qty: 180 TABLET | Refills: 0 | Status: SHIPPED | OUTPATIENT
Start: 2025-02-20

## 2025-02-20 RX ORDER — LOSARTAN POTASSIUM 50 MG/1
50 TABLET ORAL DAILY
Qty: 90 TABLET | Refills: 0 | Status: SHIPPED | OUTPATIENT
Start: 2025-02-20

## 2025-02-20 NOTE — TELEPHONE ENCOUNTER
Pt did not have a future appt, but has scheduled for 3/19/25.  Last OV was 1/9/24.  I have pended one refill for 90 day supply, to maximize his insurance benefit, unless you wish to change to just a 30 day supply until his upcoming appt.

## 2025-03-19 ENCOUNTER — OFFICE VISIT (OUTPATIENT)
Dept: INTERNAL MEDICINE | Facility: CLINIC | Age: 55
End: 2025-03-19
Payer: COMMERCIAL

## 2025-03-19 VITALS
OXYGEN SATURATION: 99 % | DIASTOLIC BLOOD PRESSURE: 90 MMHG | TEMPERATURE: 96.3 F | HEIGHT: 71 IN | BODY MASS INDEX: 38.22 KG/M2 | WEIGHT: 273 LBS | HEART RATE: 65 BPM | SYSTOLIC BLOOD PRESSURE: 140 MMHG

## 2025-03-19 DIAGNOSIS — I10 ESSENTIAL HYPERTENSION: Primary | ICD-10-CM

## 2025-03-19 DIAGNOSIS — E66.9 OBESITY (BMI 30-39.9): ICD-10-CM

## 2025-03-19 DIAGNOSIS — E78.2 MIXED HYPERLIPIDEMIA: ICD-10-CM

## 2025-03-19 DIAGNOSIS — N52.9 ERECTILE DYSFUNCTION, UNSPECIFIED ERECTILE DYSFUNCTION TYPE: ICD-10-CM

## 2025-03-19 DIAGNOSIS — R73.03 PRE-DIABETES: ICD-10-CM

## 2025-03-19 RX ORDER — CARVEDILOL 6.25 MG/1
6.25 TABLET ORAL 2 TIMES DAILY WITH MEALS
Qty: 180 TABLET | Refills: 1 | Status: SHIPPED | OUTPATIENT
Start: 2025-03-19

## 2025-03-19 RX ORDER — LOSARTAN POTASSIUM 50 MG/1
50 TABLET ORAL DAILY
Qty: 90 TABLET | Refills: 1 | Status: SHIPPED | OUTPATIENT
Start: 2025-03-19

## 2025-03-19 RX ORDER — TADALAFIL 20 MG/1
20 TABLET ORAL DAILY PRN
Qty: 30 TABLET | Refills: 1 | Status: SHIPPED | OUTPATIENT
Start: 2025-03-19

## 2025-03-19 RX ORDER — TADALAFIL 20 MG/1
20 TABLET ORAL DAILY PRN
Qty: 30 TABLET | Refills: 1 | Status: SHIPPED | OUTPATIENT
Start: 2025-03-19 | End: 2025-03-19

## 2025-03-19 NOTE — PROGRESS NOTES
"    Chief Complaint  Follow-up (Follow up for medication refill. )    Subjective        Omer Hannon presents to [facility]  History of Present Illness  See below.     Objective   Vital Signs:  /90   Pulse 65   Temp 96.3 °F (35.7 °C) (Temporal)   Ht 180.3 cm (71\")   Wt 124 kg (273 lb)   SpO2 99%   BMI 38.08 kg/m²   Estimated body mass index is 38.08 kg/m² as calculated from the following:    Height as of this encounter: 180.3 cm (71\").    Weight as of this encounter: 124 kg (273 lb).     Class 2 Severe Obesity (BMI >=35 and <=39.9). Obesity-related health conditions include the following: hypertension, impaired fasting glucose, and dyslipidemias. Obesity is worsening. BMI is is above average; BMI management plan is completed. We discussed portion control and increasing exercise.    Physical Exam  Constitutional:       Appearance: He is obese. He is not ill-appearing.   HENT:      Head: Normocephalic and atraumatic.   Eyes:      Conjunctiva/sclera: Conjunctivae normal.      Pupils: Pupils are equal, round, and reactive to light.   Cardiovascular:      Rate and Rhythm: Normal rate and regular rhythm.      Heart sounds: Normal heart sounds.   Pulmonary:      Effort: Pulmonary effort is normal. No respiratory distress.      Breath sounds: Normal breath sounds.   Musculoskeletal:         General: No swelling.   Skin:     General: Skin is warm and dry.      Findings: No rash.   Neurological:      General: No focal deficit present.      Mental Status: He is alert and oriented to person, place, and time.   Psychiatric:         Mood and Affect: Mood normal.         Behavior: Behavior normal.         Thought Content: Thought content normal.         Judgment: Judgment normal.        Result Review :  Nothing since January 2024.         Assessment and Plan   Diagnoses and all orders for this visit:    1. Essential hypertension (Primary)  -     CBC & Differential; Future  -     " Comprehensive metabolic panel; Future  -     TSH Rfx On Abnormal To Free T4; Future  -     losartan (Cozaar) 50 MG tablet; Take 1 tablet by mouth Daily.  Dispense: 90 tablet; Refill: 1  -     carvedilol (Coreg) 6.25 MG tablet; Take 1 tablet by mouth 2 (Two) Times a Day With Meals.  Dispense: 180 tablet; Refill: 1    2. Mixed hyperlipidemia  -     Lipid Panel; Future    3. Pre-diabetes  -     Hemoglobin A1c; Future    4. Obesity (BMI 30-39.9)    5. Erectile dysfunction, unspecified erectile dysfunction type  -     Discontinue: tadalafil (Cialis) 20 MG tablet; Take 1 tablet by mouth Daily As Needed for Erectile Dysfunction.  Dispense: 30 tablet; Refill: 1  -     tadalafil (Cialis) 20 MG tablet; Take 1 tablet by mouth Daily As Needed for Erectile Dysfunction.  Dispense: 30 tablet; Refill: 1       Presents today for follow-up.  He has not been in the office since January 2024.  He canceled an appointment with us in April and then ultimately canceled appointments with cardiology in June.    His blood pressure is 140/90.  He admits that he is not checking it very often, but feels like when he does he is getting 130 for the systolic number.  I provided him with a blood pressure log.  He plans to monitor this more closely until he is back in the office for his yearly exam.  He is requesting refills of carvedilol and losartan today.    He needs lipids reassessed soon.    He needs hemoglobin A1c reassessed soon.    He has gained weight.  He brought this up himself before I could.  He is going to start going to the gym more often.  They have just put 1 at his work.  He and some coworkers plan to get together and exercise.  He plans to walk more.  He plans to watch his diet better.    He has previously used sildenafil for ED.  He would like to try tadalafil and see if it is better.  Getting his hypertension under better control and losing some weight would also more than likely help with this issue as well.    He wants to come  back in May for a physical.  He will get fasting blood work prior to his appointment.      Follow Up   Return in about 2 months (around 5/19/2025) for Annual physical.  Patient was given instructions and counseling regarding his condition or for health maintenance advice. Please see specific information pulled into the AVS if appropriate.      LILIA Franklin DO       Electronically signed by JANET Franklin DO, 03/19/25, 1:34 PM CDT.

## 2025-05-16 ENCOUNTER — OFFICE VISIT (OUTPATIENT)
Dept: INTERNAL MEDICINE | Facility: CLINIC | Age: 55
End: 2025-05-16
Payer: COMMERCIAL

## 2025-05-16 VITALS
OXYGEN SATURATION: 95 % | HEIGHT: 71 IN | BODY MASS INDEX: 38.64 KG/M2 | WEIGHT: 276 LBS | SYSTOLIC BLOOD PRESSURE: 134 MMHG | HEART RATE: 79 BPM | TEMPERATURE: 97.1 F | DIASTOLIC BLOOD PRESSURE: 72 MMHG

## 2025-05-16 DIAGNOSIS — E66.9 OBESITY (BMI 30-39.9): ICD-10-CM

## 2025-05-16 DIAGNOSIS — Z00.00 ANNUAL PHYSICAL EXAM: Primary | ICD-10-CM

## 2025-05-16 DIAGNOSIS — N52.9 ERECTILE DYSFUNCTION, UNSPECIFIED ERECTILE DYSFUNCTION TYPE: ICD-10-CM

## 2025-05-16 DIAGNOSIS — I10 ESSENTIAL HYPERTENSION: ICD-10-CM

## 2025-05-16 DIAGNOSIS — E78.2 MIXED HYPERLIPIDEMIA: ICD-10-CM

## 2025-05-16 DIAGNOSIS — R73.03 PRE-DIABETES: ICD-10-CM

## 2025-05-16 RX ORDER — SILDENAFIL 100 MG/1
100 TABLET, FILM COATED ORAL DAILY PRN
Qty: 10 TABLET | Refills: 5 | Status: SHIPPED | OUTPATIENT
Start: 2025-05-16

## 2025-05-16 NOTE — PROGRESS NOTES
"    Chief Complaint  Annual Exam (Would like to discuss stopping cialis due to side effects and going back to viagra. )    Russel Hannon presents to [facility]  History of Present Illness  See below.     Objective   Vital Signs:  /72   Pulse 79   Temp 97.1 °F (36.2 °C) (Temporal)   Ht 180.3 cm (71\")   Wt 125 kg (276 lb)   SpO2 95%   BMI 38.49 kg/m²   Estimated body mass index is 38.49 kg/m² as calculated from the following:    Height as of this encounter: 180.3 cm (71\").    Weight as of this encounter: 125 kg (276 lb).         Physical Exam  Constitutional:       General: He is not in acute distress.     Appearance: He is obese. He is not ill-appearing.   HENT:      Head: Normocephalic and atraumatic.      Right Ear: Tympanic membrane and ear canal normal.      Left Ear: Tympanic membrane and ear canal normal.      Mouth/Throat:      Mouth: Mucous membranes are moist.      Pharynx: Oropharynx is clear.   Eyes:      Conjunctiva/sclera: Conjunctivae normal.      Pupils: Pupils are equal, round, and reactive to light.   Cardiovascular:      Rate and Rhythm: Normal rate and regular rhythm.      Heart sounds: Normal heart sounds.   Pulmonary:      Effort: Pulmonary effort is normal. No respiratory distress.      Breath sounds: Normal breath sounds.   Abdominal:      Tenderness: There is no abdominal tenderness.   Musculoskeletal:         General: No swelling.      Cervical back: Neck supple.   Lymphadenopathy:      Cervical: No cervical adenopathy.   Skin:     General: Skin is warm and dry.      Findings: No rash.   Neurological:      General: No focal deficit present.      Mental Status: He is alert and oriented to person, place, and time.   Psychiatric:         Mood and Affect: Mood normal.         Behavior: Behavior normal.         Thought Content: Thought content normal.         Judgment: Judgment normal.       Result Review :  He is overdue for labs.   "       Assessment and Plan   Diagnoses and all orders for this visit:    1. Annual physical exam (Primary)    2. Essential hypertension    3. Mixed hyperlipidemia    4. Pre-diabetes    5. Obesity (BMI 30-39.9)    6. Erectile dysfunction, unspecified erectile dysfunction type  -     sildenafil (Viagra) 100 MG tablet; Take 1 tablet by mouth Daily As Needed for Erectile Dysfunction.  Dispense: 10 tablet; Refill: 5       Presents today for annual physical exam.  I last saw him in March and encouraged him to come back in a few months as he had not been seen in over a year (last January 2024).    His blood pressure today is 134/72.  He has been getting low 130s/low 80s at home with regular checking.  We will continue the same dosing of losartan and carvedilol for now.    When we first discussed his hypertriglyceridemia and prediabetes in November 2023, he wanted a chance to use diet lifestyle modification to improve his numbers before considering a pharmaceutical agent.  He at that point in time was motivated to lose weight.  Unfortunately, he is essentially the same weight again today as he was in late 2023.  He was able to lose 12 pounds from late November 2023 until January 2024.  I anticipate that his hemoglobin A1c and lipids are probably going to look similar to his last evaluation.    He would like to go back on sildenafil rather than tadalafil.  He felt he had more headache and flushing with the tadalafil.  He may want to omit his second dose of carvedilol on the nights that he uses his PDE 5.      Cologuard was negative in December 2023.  Reassess next December.    He had a PSA checked in January 2024 that was normal.  He does not have any family history of prostate cancer.  We can wait until he is 56 to continue checking.    Counseled on appropriate vision and dental screening.  He perceives that he might have some vision difficulties.  He does not have an optometrist.  I recommended Dr. Dania Escobar.  He sees   [facility] for dentistry.    He was going to come fasting today, but was unable to do so.  He will come back 1 morning next week and get his fasting labs done.  I will give him further instructions as soon as I see results.    Plan to have him back in November.  He knows that he can reach out sooner if problems.    Follow Up   Return in about 6 months (around 11/16/2025) for Recheck.  Patient was given instructions and counseling regarding his condition or for health maintenance advice. Please see specific information pulled into the AVS if appropriate.      LILIA Franklin DO       Electronically signed by JANET Franklin DO, 05/16/25, 2:43 PM CDT.

## 2025-07-09 ENCOUNTER — TELEPHONE (OUTPATIENT)
Dept: INTERNAL MEDICINE | Facility: CLINIC | Age: 55
End: 2025-07-09
Payer: COMMERCIAL